# Patient Record
Sex: FEMALE | Race: WHITE | NOT HISPANIC OR LATINO | Employment: STUDENT | URBAN - METROPOLITAN AREA
[De-identification: names, ages, dates, MRNs, and addresses within clinical notes are randomized per-mention and may not be internally consistent; named-entity substitution may affect disease eponyms.]

---

## 2022-02-02 ENCOUNTER — PRENATAL OFFICE VISIT (OUTPATIENT)
Dept: NURSING | Facility: CLINIC | Age: 27
End: 2022-02-02
Payer: COMMERCIAL

## 2022-02-02 DIAGNOSIS — Z34.00 SUPERVISION OF NORMAL FIRST PREGNANCY: Primary | ICD-10-CM

## 2022-02-02 PROCEDURE — 99207 PR NO CHARGE NURSE ONLY: CPT

## 2022-02-02 RX ORDER — VITAMIN A ACETATE, .BETA.-CAROTENE, ASCORBIC ACID, CHOLECALCIFEROL, .ALPHA.-TOCOPHEROL ACETATE, DL-, THIAMINE MONONITRATE, RIBOFLAVIN, NIACINAMIDE, PYRIDOXINE HYDROCHLORIDE, FOLIC ACID, CYANOCOBALAMIN, CALCIUM CARBONATE, FERROUS FUMARATE, ZINC OXIDE, AND CUPRIC OXIDE 2000; 2000; 120; 400; 22; 1.84; 3; 20; 10; 1; 12; 200; 27; 25; 2 [IU]/1; [IU]/1; MG/1; [IU]/1; MG/1; MG/1; MG/1; MG/1; MG/1; MG/1; UG/1; MG/1; MG/1; MG/1; MG/1
1 TABLET ORAL DAILY
COMMUNITY
End: 2022-11-07

## 2022-02-02 NOTE — PROGRESS NOTES
NPN nurse visit done over the phone. Pt will be given NPN folder and book at her upcoming appt.   Discussed optional screening available to assess chromosomal anomalies. Questions answered. Pt advised to call the clinic if she has any questions or concerns related to her pregnancy. Prenatal labs will be obtained at her upcoming appt. New prenatal visit scheduled on 2/7/22 with Aline REINA CNM.    [id number]    No results found for: PAP        Patient supplied answers from flow sheet for:  Prenatal OB Questionnaire.  Past Medical History  Have you ever recieved care for your mental health? : No  Have you ever been in a major accident or suffered serious trauma?: No  Within the last year, has anyone hit, slapped, kicked or otherwise hurt you?: No  In the last year, has anyone forced you to have sex when you didn't want to?: No    Past Medical History 2   Have you ever received a blood transfusion?: No  Would you accept a blood transfusion if was medically recommended?: Yes  Does anyone in your home smoke?: No   Is your blood type Rh negative?: No  Have you ever ?: No  Have you been hospitalized for a nonsurgical reason excluding normal delivery?: No  Have you ever had an abnormal pap smear?: No    Past Medical History (Continued)  Do you have a history of abnormalities of the uterus?: No  Did your mother take KP or any other hormones when she was pregnant with you?: No  Do you have any other problems we have not asked about which you feel may be important to this pregnancy?: No

## 2022-02-03 ENCOUNTER — ANCILLARY PROCEDURE (OUTPATIENT)
Dept: ULTRASOUND IMAGING | Facility: CLINIC | Age: 27
End: 2022-02-03
Payer: COMMERCIAL

## 2022-02-03 ENCOUNTER — LAB (OUTPATIENT)
Dept: LAB | Facility: CLINIC | Age: 27
End: 2022-02-03

## 2022-02-03 DIAGNOSIS — Z34.00 SUPERVISION OF NORMAL FIRST PREGNANCY: ICD-10-CM

## 2022-02-03 LAB
ABO/RH(D): NORMAL
ANTIBODY SCREEN: NEGATIVE
ERYTHROCYTE [DISTWIDTH] IN BLOOD BY AUTOMATED COUNT: 13.2 % (ref 10–15)
HBV SURFACE AG SERPL QL IA: NONREACTIVE
HCT VFR BLD AUTO: 38.7 % (ref 35–47)
HCV AB SERPL QL IA: NONREACTIVE
HGB BLD-MCNC: 12.7 G/DL (ref 11.7–15.7)
HIV 1+2 AB+HIV1 P24 AG SERPL QL IA: NONREACTIVE
MCH RBC QN AUTO: 28.9 PG (ref 26.5–33)
MCHC RBC AUTO-ENTMCNC: 32.8 G/DL (ref 31.5–36.5)
MCV RBC AUTO: 88 FL (ref 78–100)
PLATELET # BLD AUTO: 195 10E3/UL (ref 150–450)
RBC # BLD AUTO: 4.4 10E6/UL (ref 3.8–5.2)
SPECIMEN EXPIRATION DATE: NORMAL
WBC # BLD AUTO: 9.9 10E3/UL (ref 4–11)

## 2022-02-03 PROCEDURE — 36415 COLL VENOUS BLD VENIPUNCTURE: CPT

## 2022-02-03 PROCEDURE — 86780 TREPONEMA PALLIDUM: CPT

## 2022-02-03 PROCEDURE — 87340 HEPATITIS B SURFACE AG IA: CPT

## 2022-02-03 PROCEDURE — 85027 COMPLETE CBC AUTOMATED: CPT

## 2022-02-03 PROCEDURE — 76801 OB US < 14 WKS SINGLE FETUS: CPT | Performed by: OBSTETRICS & GYNECOLOGY

## 2022-02-03 PROCEDURE — 86901 BLOOD TYPING SEROLOGIC RH(D): CPT

## 2022-02-03 PROCEDURE — 86762 RUBELLA ANTIBODY: CPT

## 2022-02-03 PROCEDURE — 86900 BLOOD TYPING SEROLOGIC ABO: CPT

## 2022-02-03 PROCEDURE — 87086 URINE CULTURE/COLONY COUNT: CPT

## 2022-02-03 PROCEDURE — 86850 RBC ANTIBODY SCREEN: CPT

## 2022-02-03 PROCEDURE — 86803 HEPATITIS C AB TEST: CPT

## 2022-02-03 PROCEDURE — 87389 HIV-1 AG W/HIV-1&-2 AB AG IA: CPT

## 2022-02-04 PROBLEM — O26.899 RH NEGATIVE STATE IN ANTEPARTUM PERIOD: Status: ACTIVE | Noted: 2022-02-04

## 2022-02-04 PROBLEM — Z67.91 RH NEGATIVE STATE IN ANTEPARTUM PERIOD: Status: ACTIVE | Noted: 2022-02-04

## 2022-02-04 LAB
BACTERIA UR CULT: NO GROWTH
RUBV IGG SERPL QL IA: 3.82 INDEX
RUBV IGG SERPL QL IA: POSITIVE
T PALLIDUM AB SER QL: NONREACTIVE

## 2022-02-07 ENCOUNTER — PRENATAL OFFICE VISIT (OUTPATIENT)
Dept: MIDWIFE SERVICES | Facility: CLINIC | Age: 27
End: 2022-02-07
Payer: COMMERCIAL

## 2022-02-07 VITALS — WEIGHT: 167 LBS | DIASTOLIC BLOOD PRESSURE: 62 MMHG | SYSTOLIC BLOOD PRESSURE: 102 MMHG

## 2022-02-07 DIAGNOSIS — Z34.01 ENCOUNTER FOR SUPERVISION OF NORMAL FIRST PREGNANCY IN FIRST TRIMESTER: Primary | ICD-10-CM

## 2022-02-07 PROCEDURE — 99207 PR PRENATAL VISIT: CPT | Performed by: ADVANCED PRACTICE MIDWIFE

## 2022-02-07 NOTE — PATIENT INSTRUCTIONS
Welcome to [facility]   and thank you for choosing us for your maternity care provider!    Pregnancy: Body Changes  From conception (fertilization) until after the birth of your child, you and your baby will change every day. To help you understand what is happening, we ve outlined how pregnancy begins and some of the changes you may notice.  How Pregnancy Begins  Conception is the union of a sperm and an egg. When it occurs, your baby s genetic makeup is complete, even its sex. Fertilization takes place in the fallopian tube. The fertilized egg then travels down this tube to the uterus (womb). The egg attaches to the lining of the uterus about a week later. There it grows and is nourished.    Your Changing Body  Pregnancy affects almost every part of your body. You may notice some of the following physical and emotional changes:    Your uterus expands outward and upward as your baby grows. You may feel pressure on your bladder, stomach, and other organs.    You may notice skin color changes on your forehead, nose, and cheeks. A dark line may form from your bellybutton down to your pubic area. The skin color around your nipples and thighs may also change.    Pink stretch marks may appear on your abdomen, breasts, or hips.    Your hair may seem thicker. You lose less hair during pregnancy.    You may feel fine one day and weepy the next. This is caused by changes in your body, such as increased hormones (chemicals that affect the function of certain organs and also your moods).      Adapting to Pregnancy: First Trimester  As your body adjusts, you may have to change or limit your daily activities. You ll need more rest. You may also need to use the energy you have more wisely.  Eat stomach-friendly foods like cottage cheese, crackers, or bread throughout the day.    Your Changing Body  Almost every part of your body is affected as you adapt to pregnancy. The uterus and cervix will begin to soften  right away. You may not look very pregnant during the first three months. But you are likely to have some common signs of early pregnancy:    Nausea    Fatigue    Frequent urination    Mood swings    Bloating of the abdomen    Missed or light periods (first trimester bleeding)    Nipple or breast tenderness, breast swelling      It s Not Too Late to Start Good Habits  What matters most is protecting your baby from this moment on. If you smoke, drink alcohol, or use drugs, now is the time to stop. If you need help, talk with your health care provider.    Smoking increases the risk of stillbirth or having a low-birth-weight baby. If you smoke, quit now.    Alcohol and drugs have been linked with miscarriage, birth defects, intellectual disability, and low birth weight. Do not drink alcohol or take drugs.    Tips to Relieve Nausea  Although nausea can occur at any time of the day, it may be worse in the morning. To help prevent nausea:    Eat small, light meals at frequent intervals.    Get up slowly. Eat a few unsalted crackers before you get out of bed.    Drink water with lemon slices.    Eat an ice pop in your favorite flavor.    Ask your health care provider about taking shanthi or vitamin B6 for nausea and vomiting.    Talk with your health care provider if you take vitamins that upset your stomach.    Work Concerns  The end of the first trimester is a good time to discuss working during pregnancy with your employer. Follow your health care provider s advice if your job requires you to stand for a long time, work with hazardous tools, or even sit at a desk all day. Your workspace, workload, or scheduled hours may need to be adjusted. Perhaps you can change body postures more often or take an extra break.  Advice for Travel  Talk to your health care provider first, but the second trimester may be the best time for any travel. You may be advised to avoid certain trips while you re pregnant. Food and water can be  concerns in developing countries. Travel by car is a good choice, as you can stop, get out, and stretch. Bring snacks and water along. Fasten the lap belt below your belly, low over your hips. Also be sure to wear the shoulder harness.  Intimacy  Unless your health care provider tells you to, there is no reason to stop having sex while you re pregnant. You or your partner may notice changes in desire. Desire may be less in the first trimester, due to nausea and fatigue. In the second trimester, sex may be very enjoyable. The third trimester can be a challenge comfort-wise. Try different positions and see what s best for you both.      Pregnancy: Your First Trimester Changes  The first trimester is a time of rapid development for your baby. Because your baby is growing so quickly, it is important that you start a healthy lifestyle right away. By the end of the first trimester, your baby has formed all of its major body organs and weighs just over an ounce.    Month 1 (Weeks 1-4)  The placenta (the organ that nourishes your baby) begins to form. The heart and lungs begin to develop. Your baby is about 1/4 inch long by the end of the first month.    Month 2 (Weeks 5-8)  All of your baby s major body organs form. The face, fingers, toes, ears, and eyes appear. By the end of the month, your baby is about 1 inch long.    Month 3 (Weeks 9-12)  Your baby can open and close its fists and mouth. The sexual organs begin to form. As the first trimester ends, your baby is about 4 inches long.      Pregnancy: Your Weight  Being a healthy weight is important for both you and your baby. The weight you gain now is not just extra fat. It is also the weight of your baby. And it is the increased blood and fluids to support the baby. A slow, steady rate of gain is best. How much you should gain depends on your weight before getting pregnant. Check with your health care provider to find out what is right for you.    If You Gain Too  Much  Gaining too much weight might cause you to feel tired or you could have a harder pregnancy or birth. If you and your health care provider decide you re gaining too much:    Eat fewer fats and sugars. Instead, eat fruit, vegetables, and whole-grain foods.    Drink plenty of water between meals.    Get at least 20 minutes of light exercise, such as walking, each day.    Don t diet. You might not get enough of the nutrients you or your baby needs.    Keep a diet diary to help you gauge what and how much you are eating .    If You re Not Gaining Enough  If you don t gain enough, your baby could be too small or have health problems. Women tend to gain most of their weight in the second and third trimesters. For now:    Eat many types of foods. Make sure you get enough calcium, protein, and carbohydrates.    Don t skip meals.    Eat healthy snacks.    Pick nutrient-dense, high calorie healthy food like trail mix or protein shakes.    See a dietitian for help.    Talk to your healthcare provider if you have had an eating disorder or problems with certain foods.      Pregnancy: Common Questions  There are plenty of myths and  old wives  tales  surrounding pregnancy. You may need help  fact from fiction. On this sheet, you ll find answers to a few common questions. If you have other questions, talk with a midwife.    Will Working Harm My Baby?  In most cases, working throughout your pregnancy is not harmful at all. There may be concerns if the job involves dangerous machinery or chemicals, lifting, or standing for very long periods of time. Talk to your health care provider and employer about your particular job and pregnancy.  Why Can t I Change the Cat Litter Box?  Cats carry a disease called toxoplasmosis. In adult humans, it shows up as a mild infection of the blood and organs. If you are infected during pregnancy, the baby s brain and eyes could be damaged. To be safe, have someone else change the  litter. If you must handle it, wear a paper mask over your nose and mouth. Also, wear gloves and wash your hands afterward.  Which Medications Are Safe?  No prescription or over-the-counter drug is safe for everyone all of the time. But sometimes medications are needed. Be sure your health care provider knows you are pregnant. Then use only the medications he or she advises you to take. Please refer to the below resources for further information and discuss concern and questions with your midwife.  Is It True That I Can Overheat My Baby?  Yes. To avoid making your baby too warm:    Don t sit in a jacuzzi. A long, warm bath is fine, but not in water over 100 F.    Exercise less intensely if you feel fatigued. Base your workout on how you feel, not your heart rate. Heart rates aren t a good way to measure effort during pregnancy.  Can I Lift and Carry Safely?  Yes, if your health care provider doesn t tell you otherwise. Learn to lift and carry safely to avoid injury and reduce back pain during pregnancy. To protect your back:    Bend at the knees to bring the load nearer.    Get a good . Test the weight of the load.    Tighten your abdomen. Exhale as you lift.    Lift with your legs, not with your back.    Carry the load close to your body.    Hold the load so you can see where you are going.  What If I Get Sick?  Most women get sick at least once during pregnancy. Talk with your health care provider if you do. Most likely it will not affect your pregnancy. Get plenty of rest and fluids, and eat what you can. Talk to your health care provider before taking any medications.        HEALTHY PREGNANCY CARE: 10-14 WEEKS PREGNANT     By weeks 10 to 14 of your pregnancy, the placenta has formed inside your uterus. It may be possible to hear your baby's heartbeat with a doppler ultrasound device. Your baby's eyes can and do move. The arms and legs can bend.    GENETIC SCREENING OPTIONS AT [facility]                 All testing is optional. We don t recommend or discourage any test; it is totally up to you and your partner. Some couples wish to know their risk of having a baby with a genetic defect and others do not. We will support your decision. Abnormal results may lead to a discussion of options for further testing.    Accurate dating of your pregnancy is important for all testing so an ultrasound may be done prior to referral or testing.    No testing provides certainty; there are false positives and negatives associated with all testing, some more than others.    Most genetic testing is non-invasive (requires only a blood sample and sometimes an ultrasound or both).    It is always wise to check with your insurance carrier before proceeding.    Some testing can be done at our lab and some require a referral.    If you decide to do no testing, the 20 week ultrasound scan, which is a routine or standard ultrasound, has some ability to detect abnormalities in the baby, and identifies obstetric problems.    If you are over 35, you will have the option of a Level II ultrasound, which is a more detailed and targeting scan, that helps detect fetal anomalies as well as obstetric problems.      If you have a more complex family history of chromosomal abnormalities, a referral to a genetic counselor and/or a Maternal Fetal Medicine specialist, to help identify available tests, may be recommended.    TYPES OF GENETIC TESTING AVAILABLE INCLUDE:    Carrier Screening/Testing for Genetic Conditions    There are many inherited conditions for which testing or carrier testing is available. Carrier screening can test for conditions like Cystic Fibrosis, Thalassemia, Adan Sachs, Sickle Cell, Hemophilia, Muscular Dystrophy, Teo s disease and many others.     Cystic Fibrosis (CF) affects both males and females and people from all racial and ethnic groups. However, the disease is most common among Caucasians of Northern   descent. CF is also common among Latinos and American Indians. The disease is less common among  Americans and  Americans. More than 10 million Americans are carriers of a faulty CF gene and many of them don't know that they are CF carriers. One or both parents can be tested any time before or during pregnancy.    Talk to your care provider about your family history and whether you should be screened. A referral to a genetic counselor at [facility] or [facility] can be made by your care provider at any time.    This is also tested for in the [place] Metabolic Screen that your infant receives 24 hours after birth.     Fetal DNA cell Testing: [place] or Innatal (Non-Invasive Prenatal Testing)    At 10 wk or greater, a blood sample can be drawn here at clinic or at any of our referral offices. It will provide highly accurate results with low false positive rates for trisomy 18, trisomy 21 (Down Syndrome), and trisomy 13 (> 99% trisomy detection rate at a false positive rate of <0.1%). Gender identification can also be obtained if desired (98% accuracy).  Can also detect some sex-linked chromosomal abnormalities (80-90% accuracy).  This is often done if one of the other screening tests is abnormal.        1st Trimester Screening:     Everyone has an age related risk of having a baby with a genetic abnormality. This testing provides a risk profile which is better than assigning risk based solely on your age.    Refines your risk of having a baby with a chromosomal abnormality such as Trisomy 21 & 18.    This test with detect a fetus with one of these disorders about 85% of the time.    A thickened nuchal fold can also be associated with cardiac defects.    This test requires a blood collection combined with ultrasound to obtain a measurement of fluid at back of baby s neck (nuchal translucency).    False positive results occur approximately 5% of cases.    An additional blood sample may be  necessary in order to calculate your risk of having a baby with a neural tube defect (e.g. spina bifida).  This is called the AFP test (alpha fetal protein).  An ultrasound should be able to  any spinal defect as well.    Follow-up of an abnormal test may include a more extensive ultrasound study and you may be offered an amniocentesis (a small sample of amniotic fluid is withdrawn and studied) for a definitive diagnosis    Quad Screen (4-marker screen)    Between 15 and 21 weeks, a sample of blood can be drawn at our lab to assess your risk of having a baby with Down Syndrome, Trisomy 18 and neural tube defects. Such testing is able to detect these conditions in 80% of cases and the false-positive rate is approximately 5%.     Follow-up of an abnormal test may include a more extensive ultrasound study and you may be offered an amniocentesis (a small sample of amniotic fluid is withdrawn and studied) for a definitive diagnosis      Referrals opportunities include:    [facility],  [facility], [facility]  o Offers nuchal translucency ultrasound; does not offer genetic counseling.    [place]  [facility] ([facility]):   o Approximately an hour long visit includes 30 min with genetic counselor who discusses all testing available and which ones might be beneficial to you based on age, personal and family history.    o Blood will be drawn and the nuchal translucency ultrasound will be discussed and performed if desired. The Free Fetal DNA testing ([place]/[facility]) can be drawn also.  o Targeted or detailed Level II ultrasounds are also available with these perinatology groups.        Breastfeeding: a Healthy Option for You and Your Baby  Consider breastfeeding for the healthiest way to feed your baby. Ask your midwife or physician for more information.     The choice of how you will feed your baby is important.  Before your baby s birth, you ll want  to learn about the benefits of breastfeeding.  [facility] have been designated Baby Friendly; an initiative that was created by the World Health Organization and UNICEF.  This helps give you and your baby the best start in feeding their baby.    Why should I breastfeed my baby?    Babies are less likely to develop childhood obesity or diabetes    Babies are less likely to suffer from recurrent ear infections    Babies are less likely to be hospitalized for respiratory conditions    Breast milk is rich in nutrients and antibodies-it is easy to digest    How does it benefit me?    Lowers the risk for diabetes, breast and ovarian cancer and postpartum depression    Moms can lose  baby weight  more quickly    Cost savings - formula can cost well over $1,500 per year    Convenient - no bottles and nipples to sterilize, no measuring and mixing formula    The physical contact with breastfeeding can make babies feel secure, warm and comforted     What about formula?  While you and your baby are staying with us at [facility], we will support whatever feeding choice you make for your baby.    Some important considerations:      The American Academy of Pediatrics, the World Health Organization, and many more organizations recommend exclusive breastfeeding for 6 months and continued breastfeeding while adding other foods for the first 1-2 years.      Any amount of breastmilk has benefits to both baby and mother.    Giving formula in replacement of breastfeeding can affect mother s milk supply.  If formula is needed, hospital staff will work with you on a plan to help develop your milk supply.    Formula alters the natural growth of good bacteria in the  stomach.     Research has found that first time mothers who offer formula in the hospital have a shorter duration of breastfeeding.    How can I start to prepare?     Start by having a conversation with your medical provider.     Talk with your partner, family  and friends.     Attend a prenatal class that includes breastfeeding preparation. Birth and breastfeeding classes are offered by [facility]. Visit [employer] for class information.     After your baby s birth, hospital staff and lactation consultants will help you and your baby get off to a great start with breastfeeding.    As your center of gravity and weight changes, use good body mechanics when changing positions and lifting. For example, use a straight back and your legs for support when lifting instead of bending over. Maintain good posture to prevent straining your muscles. Now is a good time to continue or restart your exercise program. Walking 30-60 minutes daily is an excellent way to keep fit. Yoga and swimming also offer many benefits.    The nausea and fatigue of early pregnancy have usually started to let up, so this is a good time to focus on nutrition. Consider attending a nutrition class. A healthy diet includes about 60 grams of protein each day (3-4 servings of dairy, 2-3 servings of meat/fish/poultry/nuts), 4-6 servings of whole grain foods, and 5-6 servings of fruits and vegetables. Remember to drink 6-8 glasses of water daily.    Watch for warning signs, such as     vaginal bleeding    fluid leaking from your vagina    severe abdominal pain    nausea and vomiting more than 4-5 times a day, or if you are unable to keep anything down    fever more than 100.4 degrees F.       RESOURCES   You can refer to the Starting Out Right book or find it online at http://www.[facility].org/images/stories/maternity/[facility]-Starting-Out-Right.pdf or http://www.[facility].org/images/stories/flipbooks/[facility]-starting-out-right/[facility]-starting-out-right.html#p=8    You can sign up for a weekly parenting e-mail that gives support, tips and advice from health care professionals that starts with pregnancy and continues through the toddler years. To register, go to  www.[facility].org/baby at any time during your pregnancy.    Breastfeeding:    OUTPATIENT LACTATION RESOURCES     -Schedule an appointment [facility]ATH[facility] who is also a Lactation Consultant by calling [phone]. We see women for breastfeeding visits at [facility] and [facility].     -Baby Café    Pregnant and interested in breastfeeding?  Need answers to breastfeeding questions?  Want to help breastfeeding moms?  Already breastfeeding and want to meet other moms?    Join us at the Baby Café!    Baby Cafe is a free, drop-in service offering breast-feeding support for pregnant women, breast-feeding mothers and their families.  Come share tips and socialize with other mothers.  Babies and siblings are welcome (no childcare available).    Starting April 2018, Baby Café will be at 4 locations.  Please see below for the Baby Café closest to you! Hmong, Emirati, and [place]an which is may be available at some sites.    Childbirth and Parenting Education:       Everyday Miracles:   https://www.everyday-miracles.org/    Free Video Series from [facility]: https://nursing.[facility]/academics/specialty-areas/nurse-midwifery/having-baby-prenatal-videos/having-baby-prenatal-and    REYNA parenting center: http://STinser/   (779) 863-BABY  [place]: (education, yoga & wellness) www.[employer]asticell  Enlightened Mama: www.enlightenedmama.Plasticell   Childbirth collective: (Parent topic nights)  www.childbirthcollective.org/  Hypnobabies:  www.hypnobabiestwincities.com/  Hypnobirthing:  Http://hypnobirthing.com/  The Birth Hour: https://thebirthPinchPoint.[employer]asticell/online-childbirth-class/    APPS and Podcasts:   Kaitlyn Russo Nurture    Evidence Based Birth  The Birth Hour (for birth stories)   Birthful   Expectful   The Longest Shortest Time  PregnancyPodcast Ev Youssef    Book Recommendations:   Almita Totz's Birthing From Within--first few chapters include a new-age tone,  you may prefer to skip it and keep going, because there is good stuff later.  This book recommendation covers emotional preparation, but does cover coping with pain, and use of both pharmacological and nonpharmacological methods.    Dr. Saleh' The Pregnancy Book and The Birth Book--the pregnancy book goes month-by month      The Birth Partner by Ketty Mirza    Womanly Art of Breastfeeding by La Leche League International   Bestfeeding by Isabela Zapata--great pictures    Mothering Your Nursing Toddler, by Tawnya Donato.   Addresses dealing with so many of the challenging behaviors of a nursing toddler.  How Weaning Happens, by La Leche League.  Discusses weaning at all ages, from medically necessary weaning of an infant, all the way up to age 5 (or older), with why/why not, and strategies.  Very empowering book both for deciding to wean and deciding not to.    American College of Nurse-Midwives (ACNM) http://www.midwife.org/; look at the informational handouts at http://www.midwife.org/Share-With-Women     www.mymidwife.org    Mother to Baby (Medication and Herbal guidance in pregnancy): http://www.mothertobaby.org  Toll-Free Hotline: 218.814.3159  LactMed (Medication use while breastfeeding): http://toxnet.nlm.nih.gov/newtoxnet/lactmed.htm    Women's Health.gov:  http://www.womenshealth.gov/a-z-topics/index.html    American pregnancy association - http://americanpregnancy.org    Centering Pregnancy (group prenatal care option): http://centeringhealthcare.org    Information about doulas:  Childbirth collective: http://www.childbirthcollective.org/  Doulas of [place] (MANDA):  www.manda.org  [facility]  project: http://twincitiesdoulaproject.com/     Early Childhood and Family Education (ECFE):  ECFE offers parents hands-on learning experiences that will nourish a lifetime of teachable moments.  http://ecfe.info/ecfe-home/    March of Dimes www.HauteDay     FDA - Nutrition  www.mypyramid.gov   "Under \"For Consumers,\" click on \"pregnant and breastfeeding women.\"      Centers for Disease Control and Prevention (CDC) - Vaccines : http://www.cdc.gov/vaccines/       When researching information on the web, question the validity of websites.  The domains .gov, .edu and.org tend to be more reliable information.  If there are a lot of advertisements, be cautious of the information provided. Stay away from blogs and chat rooms please!      Nutrition & supplements:     Prenatal vitamin (those with 600-1000 mcg folic acid and 27 mg of iron are enough).  Take with food or Juice     4-5 servings of dairy or other calcium rich foods (fish, leafy greens, soy) per day - if not, take 500-1000 mg additional calcium (Tums, pills, chews). Take with dairy     Vitamin D3 5778-2604 IU geltab daily.  Take with fattiest meal.  Look for fortified foods also (Dairy, Juice)     2-3 (4) oz servings of fish, seafood, nuts (walnuts & almonds), oils, avocado per week - if not, take Omega 3 Fatty acids: DHA & TR 4521-5284 mg per day.  Other names: cod liver oil, fish oil. Take with fattiest meal.  Some prenatals have DHA, but typically not a sufficient dose.    Fish: Do not eat shark, swordfish, dedra mackerel, or tilefish when you are pregnant or breastfeeding.  They contain high levels of mercury.  Limit white (albacore) tuna to no more than 6 ounces per week. Http://www.fda.gov/downloads/ForConsumers/ConsumerUpdates/XPJ555236.pdf           "

## 2022-02-07 NOTE — NURSING NOTE
Chief Complaint   Patient presents with     Prenatal Care     8 weeks- concerns of nausea        Initial /62 (BP Location: Left arm, Patient Position: Sitting, Cuff Size: Adult Regular)   Wt 75.8 kg (167 lb)   LMP 12/10/2021  There is no height or weight on file to calculate BMI.  BP completed using cuff size: regular    Questioned patient about current smoking habits.  Pt. has never smoked.          The following HM Due: NONE    8w3d  Amandeep Munguia, CMA

## 2022-02-07 NOTE — PROGRESS NOTES
PRENATAL VISIT   FIRST OBSTETRICAL EXAM - OB     Assessment / Impression   First prenatal visit at 8w3d  25yo    Last pap = 2019  BMI - need ei  EDPS = 0, 0 to #10  THOMAS for old records     Plan:      -IOB labs reviewed   -Pt is not a candidate for drawing lead level per [facility] screening tool.   -Patient is not interested in waterbirth.    -Reviewed prenatal care schedule.   -Optimal nutrition and weight gain discussed. Pregnancy weight gain of 25-35 lbs (BMI 18.5-24.9) encouraged.   -Anticipatory guidance for common pregnancy questions and concerns reviewed.   -Danger s/sx for this trimester reviewed with patient.   -Reviewed genetic screening options with patient, patient is considering elect for first trimester screening. The patient does not elect for quad screening.   -Reviewed carrier testing options with patient, patient does not elect for testing or referral to genetic counseling.   Discussed MFM level 2 due to 1/2 brother with congential heart concern.   -IOB packet given and reviewed with patient.   -CNM services and hospital options reviewed; emergency and scheduling phone numbers given to patient.   -The patient has the following high risk factors for preeclampsia:none. Therefore, low-dose aspirin will not be initiated.  -The patient has the following moderate risk factors for preeclampsia:first pregnancy. Because the patient does not have 2 or more risk factors, low-dose aspirin not be initiated.  -Antepartum VTE risk factors absent.  -Patient is not at increased risk for overt diabetes, so A1C will not be added to IOB labs today.  -Pt may be a candiate for MFM with 1/2 brother  a candidate for an antepartum OB consult.    -Return to clinic in 4 weeks or sooner as needed.    Total time: 45 minutes spent on the date of the encounter doing chart review, review of test results, patient visit and documentation.     Subjective:   Monika Yan is a 26 year old  here today for her first obstetrical  exam at 8w3d. Here with Mitchel. This pregnancy is planned Attempting pregnancy for months. The patient reports nausea, but no vomiting, fatigue and some mild breast tenderness. She has a certain Patient's last menstrual period was 12/10/2021., predicting an expected date of delivery of Estimated Date of Delivery: Sep 16, 2022.  Her pregnancy is dated by LMP.     The patient states that she is in a monogamous relationship.  Offered GC/CT screening today and patient declines.  Current symptoms also include: fatigue and nausea.     Risk factors: none. Pregnancy Risk Factors:None    VTE antepartum risk factors (two or more risk factors, or 1 * risk factor, places patient at higher risk): none.   Clinical history/risk factors requiring antepartum OB consult: none.     The patient has the following risk factors for overt diabetes: Not at increased risk, BMI normal (or under 23 for  Americans). Plus the additional risk factor(s) of: No additional risk factors.    Social History:   Education level: college. Just finishing [employer] language program    Occupation: student    Partners name: Mitchel    ?   OB History    Para Term  AB Living   1 0 0 0 0 0   SAB IAB Ectopic Multiple Live Births   0 0 0 0 0      # Outcome Date GA Lbr Edgar/2nd Weight Sex Delivery Anes PTL Lv   1 Current                 History:   Past Medical History:   Diagnosis Date     No pertinent past medical history 2022      Past Surgical History:   Procedure Laterality Date     NO HISTORY OF SURGERY  2022      Family History   Problem Relation Age of Onset     Heart Defect Brother       Social History     Tobacco Use     Smoking status: Never Smoker     Smokeless tobacco: Never Used   Vaping Use     Vaping Use: Never used   Substance Use Topics     Alcohol use: Not Currently     Drug use: Never      Current Outpatient Medications   Medication Sig Dispense Refill     calcium-magnesium (CALMAG) 500-250 MG TABS per tablet Take 1 tablet  "by mouth 2 times daily       Prenatal Vit-Fe Fumarate-FA (PNV PRENATAL PLUS MULTIVITAMIN) 27-1 MG TABS per tablet Take 1 tablet by mouth daily        No Known Allergies     The patient's medical, surgical and family histories were reviewed and were pertinent to this visit.     Pap smear: Last Pap: 2019, Result: normal, Previous History: no, Any history of abnormal: no. Next Due: 2023.     EPDS score today: 0.\"0\" to #10   History of anxiety or depression: yes- anxiety  (mom is bipolar)     Review of Systems   General: Fatigue but otherwise denies problem   Eyes: Denies problem   Ears/Nose/Throat: Denies problem   Cardiovascular: Denies problem   Respiratory: Denies problem   Gastrointestinal: Nausea without vomiting, otherwise negative   Genitourinary: Denies any discharge, vaginal bleeding or itchiness or any other problem   Musculoskeletal: Breast tenderness otherwise denies problem   Skin: Denies problem   Neurologic: Denies problem   Psychiatric: Denies problem   Endocrine: Denies problem   Heme/Lymphatic: Denies problem   Allergic/Immunologic: Denies problem         Objective:   Objective    Vitals:    02/07/22 1136   BP: 102/62   Weight: 75.8 kg (167 lb)        Physical Exam:   General Appearance: Alert, cooperative, no distress, appears stated age   HEENT: Normocephalic, without obvious abnormality, atraumatic. Conjunctiva/corneas clear, does not wear corrective lenses   Neck: Supple  Lungs: Clear to auscultation bilaterally, respirations unlabored   Heart: Regular rate and rhythm, S1 and S2 normal, no murmur, rub, or gallop. No edema to lower extremities.   Breasts: deferred  Abdomen: Gravid    FHT: not attempted to due gestational age  Neurologic: Alert and oriented x 3. Normal speech   Lab: No results found for any visits on 02/07/22.            "

## 2022-02-21 ENCOUNTER — PRENATAL OFFICE VISIT (OUTPATIENT)
Dept: MIDWIFE SERVICES | Facility: CLINIC | Age: 27
End: 2022-02-21
Payer: COMMERCIAL

## 2022-02-21 VITALS — DIASTOLIC BLOOD PRESSURE: 66 MMHG | WEIGHT: 166.9 LBS | SYSTOLIC BLOOD PRESSURE: 106 MMHG

## 2022-02-21 DIAGNOSIS — Z34.01 ENCOUNTER FOR SUPERVISION OF NORMAL FIRST PREGNANCY IN FIRST TRIMESTER: Primary | ICD-10-CM

## 2022-02-21 LAB — INTERPRETATION: NORMAL

## 2022-02-21 PROCEDURE — 36415 COLL VENOUS BLD VENIPUNCTURE: CPT | Performed by: REGISTERED NURSE

## 2022-02-21 PROCEDURE — 99207 PR PRENATAL VISIT: CPT | Performed by: REGISTERED NURSE

## 2022-02-21 NOTE — PATIENT INSTRUCTIONS
Weeks 9 thru 13 - Gestational Age (Fetal Development - Weeks 7 thru 11)  At 10 weeks, the embryo is at the end of the embryonic period and begins the fetal period. The fetus has grown to about 3 inches in length and weighs about an ounce. The genitalia have clearly formed into male or female, but still can not be seen clearly on an ultrasound. The eyelids close and will not reopen until the around 28 weeks of gestation. The fetus can make a fist, and the buds for baby teeth appear. The head is nearly half the size of the entire fetus.

## 2022-02-21 NOTE — NURSING NOTE
Chief Complaint   Patient presents with     Prenatal Care   10w3d  Discuss NIPS    initial /66   Wt 75.7 kg (166 lb 14.4 oz)   LMP 12/10/2021  There is no height or weight on file to calculate BMI.  BP completed using cuff size regular.  Katy Og CMA

## 2022-03-01 ENCOUNTER — TELEPHONE (OUTPATIENT)
Dept: OBGYN | Facility: CLINIC | Age: 27
End: 2022-03-01
Payer: COMMERCIAL

## 2022-03-01 LAB — SCANNED LAB RESULT: NORMAL

## 2022-03-01 NOTE — TELEPHONE ENCOUNTER
Pt advised of Neg NIPS.    Pt requests that the gender being mailed to her home address.  Alize NICOLE RN

## 2022-03-01 NOTE — TELEPHONE ENCOUNTER
Please call Monika and let her know about her negative genetic screen. She would like the sex of the baby placed in an envelope and left at .      Thank you,    BEN Dobbins, SATHISH

## 2022-03-23 ENCOUNTER — PRENATAL OFFICE VISIT (OUTPATIENT)
Dept: MIDWIFE SERVICES | Facility: CLINIC | Age: 27
End: 2022-03-23
Payer: COMMERCIAL

## 2022-03-23 VITALS — DIASTOLIC BLOOD PRESSURE: 66 MMHG | SYSTOLIC BLOOD PRESSURE: 102 MMHG | WEIGHT: 170.9 LBS

## 2022-03-23 DIAGNOSIS — Z82.79 FAMILY HISTORY OF CONGENITAL HEART DEFECT: ICD-10-CM

## 2022-03-23 DIAGNOSIS — Z34.01 ENCOUNTER FOR SUPERVISION OF NORMAL FIRST PREGNANCY IN FIRST TRIMESTER: Primary | ICD-10-CM

## 2022-03-23 DIAGNOSIS — Z36.89 ENCOUNTER FOR FETAL ANATOMIC SURVEY: ICD-10-CM

## 2022-03-23 PROCEDURE — 99207 PR PRENATAL VISIT: CPT | Performed by: ADVANCED PRACTICE MIDWIFE

## 2022-03-23 RX ORDER — FERROUS SULFATE 325(65) MG
325 TABLET ORAL
Status: ON HOLD | COMMUNITY
End: 2022-09-25

## 2022-03-23 RX ORDER — CHLORAL HYDRATE 500 MG
2 CAPSULE ORAL DAILY
COMMUNITY
End: 2022-10-05

## 2022-03-23 NOTE — NURSING NOTE
Chief Complaint   Patient presents with     Prenatal Care     14w 5d       Initial /66 (BP Location: Right arm, Cuff Size: Adult Regular)   Wt 77.5 kg (170 lb 14.4 oz)   LMP 12/10/2021   Breastfeeding No  There is no height or weight on file to calculate BMI.  BP completed using cuff size: regular    Questioned patient about current smoking habits.  Pt. has never smoked.          The following HM Due: NONE

## 2022-03-23 NOTE — PROGRESS NOTES
S: Patient feels well overall. Feels that nausea is improving. Denies bleeding, leaking of fluid, abnormal discharge. Does report some ear pressure and intermittent ear popping. She states that this has been an ongoing issue for her since 2018. She feels that it has worsened in pregnancy. She also is experiencing increased nasal congestion. She does take Zyrtec daily, which gives her some relief.     In addition, she has a small wart on her right hand. She is wondering what is safe in pregnancy for removal.     O: /66 (BP Location: Right arm, Cuff Size: Adult Regular)   Wt 77.5 kg (170 lb 14.4 oz)   LMP 12/10/2021   Breastfeeding No    Exam:  Constitutional: healthy, alert and no distress  Respiratory: Respirations even and unlabored  Gastrointestinal: Abdomen soft, non-tender. Fundus measures appropriately for gestational age. Fetal heart tones heard easily  Psychiatric: mentation appears normal and affect normal/bright  A:  1. (Z34.01) Encounter for supervision of normal first pregnancy in first trimester  (primary encounter diagnosis)    2. (Z82.79) Family history of congenital heart defect  Plan: [facility] Referral - Pregnancy         - Level II US d/t half brother with heart defect which required a heart transplant    3. (Z36.89) Encounter for fetal anatomic survey     P:   - Educated about diet and exercise and normal weight gain   - Normal to feel movement between 18-22 weeks   - Reviewed labs from 1st OB   - Discussed genetic screening; patient has had NIPT, negative.   - Warning signs discussed   - Discussed option for Quad screen at next visit.      - Discussed Compound W as a safe OTC method for wart removal   - Discussed concerns about congestion, ear pressure and ear popping. Discussed Sudafed as an option to try after 16 weeks. Also recommended seeing and ENT following pregnancy, given the chronic nature of the ear pressure/popping.     Return to clinic 4 weeks. Encouraged patient to  call with any questions or concerns.    FRANDY Marcano    I was present with the student who participated in the service and documentation of the services provided. I have verified the history and personally performed the physical exam and medical decision making as documented by the student and edited by me.    BEN Rodriguez, CNM

## 2022-03-23 NOTE — PATIENT INSTRUCTIONS
Over-the-counter (OTC) medications during pregnancy    Make sure to follow package directions for dosing and information unless otherwise noted on the list.    Morning sickness/nausea:      Unisom (doxylamine) 12.5 mg (1/2 tab) and Vitamin B6 25-50 mg three times daily. Unisom may cause some drowsiness as it is typically used for sleep. The combination of these medications can be very effective but they can also be taken separately    Dramamine (Dimenhydrinate) 25-50 mg every 4-6 hours as needed    Benadryl (diphenhydramine) 25-50 mg every 4-6 hours     Ginger tablets 1000 mg per day in divided doses    Constipation:      Colace (Docusate sodium)    Metamucil    Citrucel    Milk of magnesia    Fibercon    Miralax (if all other methods have failed)    Diarrhea:    Imodium (loperamide)    Heartburn:      Zantac (ranitidine)    Pepcid (famotidine)    Prilosec (omeprazole)    Antacids-Tums, Maalox (liquid or tablets), Rolaids  Pepto Bismol and Tri [place] are NOT RECOMMENDED for use during pregnancy because they contain aspirin    Hemorrhoids:      Tucks pads/ointment    Anusol/Anusol-HC    Preparation H    Gas Pain  Simethicone (Gas-X, Mylanta Gas, Mylicon)      Cough, cold, and congestion:      Robitussin (dextromethorphan) and Robitussin DM (dextromethorphan and guaifenesin)    Cough drops/zinc lozenges    Mucinex    Sudafed (after 16 weeks gestation)    Vicks Vapo rub  Cough medicine with alcohol like Nyquil is not recommended during pregnancy    Headache:      Acetaminophen (Tylenol) 650 mg every 4-6 hours or 1000 mg every 6 hours, do not exceed 4000 mg in 24 hours   Ibuprofen (Advil/Motrin) and Naproxen (Aleve) are not recommended during the 1st or 3rd trimester of pregnancy    Allergy:      Benadryl (diphenhydramine)    Zyrtec (cetirizine)    Claritin (loratadine)     Rash/Itching:      Benadryl lotion    Cortaid cream (Hydrocortisone cream)    Benadryl (diphenhydramine)    Vaginal yeast infection:      Monistat  3 or 7 day    Gyne-Lotrimin    Acne:      Benzoyl Peroxide    Salicylic acid     If you have questions or concerns about any medications that are available OTC or you are unsure if something is safe call:    Chalo Dos Santos  [phone]

## 2022-03-24 ENCOUNTER — TRANSCRIBE ORDERS (OUTPATIENT)
Dept: MATERNAL FETAL MEDICINE | Facility: CLINIC | Age: 27
End: 2022-03-24
Payer: COMMERCIAL

## 2022-03-24 DIAGNOSIS — O26.90 PREGNANCY RELATED CONDITION: Primary | ICD-10-CM

## 2022-03-24 DIAGNOSIS — O26.90 PREGNANCY RELATED CONDITION, ANTEPARTUM: Primary | ICD-10-CM

## 2022-04-03 ENCOUNTER — HEALTH MAINTENANCE LETTER (OUTPATIENT)
Age: 27
End: 2022-04-03

## 2022-04-20 ENCOUNTER — PRE VISIT (OUTPATIENT)
Dept: MATERNAL FETAL MEDICINE | Facility: CLINIC | Age: 27
End: 2022-04-20
Payer: COMMERCIAL

## 2022-04-22 ENCOUNTER — PRENATAL OFFICE VISIT (OUTPATIENT)
Dept: MIDWIFE SERVICES | Facility: CLINIC | Age: 27
End: 2022-04-22
Payer: COMMERCIAL

## 2022-04-22 VITALS
DIASTOLIC BLOOD PRESSURE: 58 MMHG | WEIGHT: 176 LBS | SYSTOLIC BLOOD PRESSURE: 108 MMHG | BODY MASS INDEX: 26.67 KG/M2 | HEIGHT: 68 IN

## 2022-04-22 DIAGNOSIS — Z34.02 ENCOUNTER FOR SUPERVISION OF NORMAL FIRST PREGNANCY IN SECOND TRIMESTER: Primary | ICD-10-CM

## 2022-04-22 PROCEDURE — 99207 PR PRENATAL VISIT: CPT | Performed by: ADVANCED PRACTICE MIDWIFE

## 2022-04-22 NOTE — PATIENT INSTRUCTIONS
Weeks 21 thru 23 - Gestational Age (Fetal Age - Weeks 19 thru 21):  You may be feeling fetal movement every day now. Lanugo now covers the fetus s entire body. The fetus is beginning to have the look of a  infant as the skin becomes less see through and  fat begins to develop. All the parts of the eyes are developed. The liver and pancreas are working hard to develop completely. The fetus has reached about 10-11 inches in length and weighs about 1 - 1   pounds

## 2022-04-22 NOTE — PROGRESS NOTES
S: Feels well and has no concerns.  Has not started feeling fetal movement.  Denies uterine cramping, vaginal bleeding or leaking of fluid  O: Vitals: /58 (BP Location: Right arm, Cuff Size: Adult Regular)   Wt 79.8 kg (176 lb)   LMP 12/10/2021   BMI= There is no height or weight on file to calculate BMI.  Exam:  Constitutional: healthy, alert and no distress  Respiratory: respirations even and unlabored  Gastrointestinal: Abdomen soft, non-tender. Fundus measures appropriate for gestational age. Fetal heart tones hear without difficulty and within normal limits  : Deferred  Psychiatric: mentation appears normal and affect normal/bright  A:    Diagnosis Comments   1. Encounter for supervision of normal first pregnancy in second trimester         P: Discussed 20 week fetal screen with MFM next week  Encouraged patient to call with any questions or concerns.      BEN Dobbins, CNM

## 2022-04-27 ENCOUNTER — OFFICE VISIT (OUTPATIENT)
Dept: MATERNAL FETAL MEDICINE | Facility: CLINIC | Age: 27
End: 2022-04-27
Attending: OBSTETRICS & GYNECOLOGY
Payer: COMMERCIAL

## 2022-04-27 ENCOUNTER — HOSPITAL ENCOUNTER (OUTPATIENT)
Dept: ULTRASOUND IMAGING | Facility: CLINIC | Age: 27
Discharge: HOME | End: 2022-04-27
Attending: OBSTETRICS & GYNECOLOGY
Payer: COMMERCIAL

## 2022-04-27 DIAGNOSIS — O26.90 PREGNANCY RELATED CONDITION, ANTEPARTUM: Primary | ICD-10-CM

## 2022-04-27 DIAGNOSIS — O26.90 PREGNANCY RELATED CONDITION, ANTEPARTUM: ICD-10-CM

## 2022-04-27 DIAGNOSIS — Z82.79 FAMILY HISTORY OF CONGENITAL HEART DISEASE: ICD-10-CM

## 2022-04-27 PROCEDURE — 76811 OB US DETAILED SNGL FETUS: CPT

## 2022-04-27 PROCEDURE — 76811 OB US DETAILED SNGL FETUS: CPT | Mod: 26 | Performed by: OBSTETRICS & GYNECOLOGY

## 2022-04-27 NOTE — PROGRESS NOTES
The patient was seen for an ultrasound in the [facility] at the [facility] today.  For a detailed report of the ultrasound examination, please see the ultrasound report which can be found under the imaging tab.    Estefany Archibald MD  , OB/GYN  Maternal-Fetal Medicine  [phone] (Pager)

## 2022-05-18 ENCOUNTER — PRENATAL OFFICE VISIT (OUTPATIENT)
Dept: MIDWIFE SERVICES | Facility: CLINIC | Age: 27
End: 2022-05-18
Payer: COMMERCIAL

## 2022-05-18 VITALS — BODY MASS INDEX: 26.77 KG/M2 | SYSTOLIC BLOOD PRESSURE: 112 MMHG | WEIGHT: 176 LBS | DIASTOLIC BLOOD PRESSURE: 66 MMHG

## 2022-05-18 DIAGNOSIS — Z82.79 FAMILY HISTORY OF CONGENITAL HEART DEFECT: ICD-10-CM

## 2022-05-18 DIAGNOSIS — Z34.02 ENCOUNTER FOR SUPERVISION OF NORMAL FIRST PREGNANCY IN SECOND TRIMESTER: Primary | ICD-10-CM

## 2022-05-18 PROCEDURE — 99207 PR PRENATAL VISIT: CPT | Performed by: ADVANCED PRACTICE MIDWIFE

## 2022-05-18 NOTE — PATIENT INSTRUCTIONS
"    Characteristics and sources of common FODMAPs    Word that corresponds to letter in acronym Compounds in this category Foods that contain these compounds   F Fermentable   O Oligosaccharides Fructans, galacto-oligosaccharides Wheat, barley, rye, onion, yas, white part of spring onion, garlic, shallots, artichokes, beetroot, fennel, peas, chicory, pistachio, cashews, legumes, lentils, and chickpeas   D Disaccharides Lactose Milk, custard, ice cream, and yogurt   M Monosaccharides \"Free fructose\" (fructose in excess of glucose) Apples, pears, mangoes, cherries, watermelon, asparagus, sugar snap peas, honey, high-fructose corn syrup   A And   P Polyols Sorbitol, mannitol, maltitol, and xylitol Apples, pears, apricots, cherries, nectarines, peaches, plums, watermelon, mushrooms, cauliflower, artificially sweetened chewing gum and confectionery   FODMAPs: fermentable oligosaccharides, disaccharides, monosaccharides, and polyols.  "

## 2022-05-18 NOTE — PROGRESS NOTES
.S: Feels well expect she is having more gas then likes. She has hx of irregular or non-regular bowl movements. She doesn't use laxatives but eats lots of fiber at times and still doesn't go often. She is not straining but does have hemorrhoids off an on. No rectal bleeding.  Baby active.  Denies uterine cramping, vaginal bleeding or leaking of fluid  O: Vitals: /66 (BP Location: Left arm, Cuff Size: Adult Regular)   Wt 79.8 kg (176 lb)   LMP 12/10/2021   BMI 26.77 kg/m    BMI= Body mass index is 26.77 kg/m .  Exam:  Constitutional: healthy, alert and no distress  Respiratory: respirations even and unlabored  Gastrointestinal: Abdomen soft, non-tender. Fundus measures appropriate for gestational age. Fetal heart tones hear without difficulty and within normal limits  : Deferred  Psychiatric: mentation appears normal and affect normal/bright  A:    Diagnosis Comments   1. Encounter for supervision of normal first pregnancy in second trimester     2. Family history of congenital heart defect       P: Discussed GCT/repeat RPR for next visit, handout provided, reminded of longer appointment.  Tdap next visit; reviewed CDC recommendations and partner/family vaccination recommended as well.  Need for Rhogam:  Yes, to be done next visit   Encouraged patient to call with any questions or concerns.  Return to clinic 4 weeks  Aline Bellamy CNM

## 2022-07-06 ENCOUNTER — PRENATAL OFFICE VISIT (OUTPATIENT)
Dept: MIDWIFE SERVICES | Facility: CLINIC | Age: 27
End: 2022-07-06
Payer: COMMERCIAL

## 2022-07-06 VITALS — DIASTOLIC BLOOD PRESSURE: 58 MMHG | WEIGHT: 179.2 LBS | BODY MASS INDEX: 27.26 KG/M2 | SYSTOLIC BLOOD PRESSURE: 108 MMHG

## 2022-07-06 DIAGNOSIS — Z13.1 SCREENING FOR DIABETES MELLITUS: ICD-10-CM

## 2022-07-06 DIAGNOSIS — O26.893 RH NEGATIVE STATUS DURING PREGNANCY IN THIRD TRIMESTER: ICD-10-CM

## 2022-07-06 DIAGNOSIS — Z67.91 RH NEGATIVE STATUS DURING PREGNANCY IN THIRD TRIMESTER: ICD-10-CM

## 2022-07-06 DIAGNOSIS — Z34.03 ENCOUNTER FOR SUPERVISION OF NORMAL FIRST PREGNANCY, THIRD TRIMESTER: Primary | ICD-10-CM

## 2022-07-06 DIAGNOSIS — Z23 NEED FOR TDAP VACCINATION: ICD-10-CM

## 2022-07-06 LAB
ANTIBODY SCREEN: NEGATIVE
ERYTHROCYTE [DISTWIDTH] IN BLOOD BY AUTOMATED COUNT: 12.8 % (ref 10–15)
GLUCOSE 1H P 50 G GLC PO SERPL-MCNC: 135 MG/DL (ref 70–129)
HCT VFR BLD AUTO: 36.1 % (ref 35–47)
HGB BLD-MCNC: 12.4 G/DL (ref 11.7–15.7)
MCH RBC QN AUTO: 30.6 PG (ref 26.5–33)
MCHC RBC AUTO-ENTMCNC: 34.3 G/DL (ref 31.5–36.5)
MCV RBC AUTO: 89 FL (ref 78–100)
PLATELET # BLD AUTO: 171 10E3/UL (ref 150–450)
RBC # BLD AUTO: 4.05 10E6/UL (ref 3.8–5.2)
SPECIMEN EXPIRATION DATE: NORMAL
WBC # BLD AUTO: 10.5 10E3/UL (ref 4–11)

## 2022-07-06 PROCEDURE — 99207 PR PRENATAL VISIT: CPT | Performed by: ADVANCED PRACTICE MIDWIFE

## 2022-07-06 PROCEDURE — 82950 GLUCOSE TEST: CPT | Performed by: ADVANCED PRACTICE MIDWIFE

## 2022-07-06 PROCEDURE — 90471 IMMUNIZATION ADMIN: CPT | Performed by: ADVANCED PRACTICE MIDWIFE

## 2022-07-06 PROCEDURE — 85027 COMPLETE CBC AUTOMATED: CPT | Performed by: ADVANCED PRACTICE MIDWIFE

## 2022-07-06 PROCEDURE — 86780 TREPONEMA PALLIDUM: CPT | Performed by: ADVANCED PRACTICE MIDWIFE

## 2022-07-06 PROCEDURE — 36415 COLL VENOUS BLD VENIPUNCTURE: CPT | Performed by: ADVANCED PRACTICE MIDWIFE

## 2022-07-06 PROCEDURE — 90715 TDAP VACCINE 7 YRS/> IM: CPT | Performed by: ADVANCED PRACTICE MIDWIFE

## 2022-07-06 PROCEDURE — 86850 RBC ANTIBODY SCREEN: CPT | Performed by: ADVANCED PRACTICE MIDWIFE

## 2022-07-06 PROCEDURE — 96372 THER/PROPH/DIAG INJ SC/IM: CPT | Performed by: ADVANCED PRACTICE MIDWIFE

## 2022-07-06 NOTE — PROGRESS NOTES
S: Feels a little overwhelmed and anxious about upcoming birth,  Baby active.  Denies uterine cramping, vaginal bleeding or leaking of fluid  O: Vitals: /58 (BP Location: Left arm, Cuff Size: Adult Regular)   Wt 81.3 kg (179 lb 3.2 oz)   LMP 12/10/2021   Breastfeeding No   BMI 27.26 kg/m    BMI= Body mass index is 27.26 kg/m .  Exam:  Constitutional: healthy, alert and no distress  Respiratory: respirations even and unlabored  Gastrointestinal: Abdomen soft, non-tender. Fundus measures appropriate for gestational age. Fetal heart tones hear without difficulty and within normal limits  : Deferred  Psychiatric: mentation appears normal and affect normal/bright  A:     ICD-10-CM    1. Encounter for supervision of normal first pregnancy, third trimester  Z34.03 CBC with platelets     Glucose tolerance gest screen 1 hour     Treponema Abs w Reflex to RPR and Titer     TDAP (ADACEL,BOOSTRIX)     rho(D) immune globulin (RHOGAM) injection 300 mcg     Antibody Screen - Red Cell     CBC with platelets     Glucose tolerance gest screen 1 hour     Treponema Abs w Reflex to RPR and Titer     Antibody Screen - Red Cell   2. Rh negative status during pregnancy  O26.899 rho(D) immune globulin (RHOGAM) injection 300 mcg    Z67.91 Antibody Screen - Red Cell     Antibody Screen - Red Cell   3. Need for Tdap vaccination  Z23 TDAP (ADACEL,BOOSTRIX)     P: GCT/repeat RPR today, handout provided.  Tdap given; reviewed CDC recommendations and partner/family vaccination recommended as well.  Need for Rhogam? Yes: .   Discussed patient options for postpartum contraception. Patient is planning condoms  Information provided for labor preparation  Encouraged patient to call with any questions or concerns.  Return to clinic 2 weeks    BEN Dobbins, SATHISH

## 2022-07-07 LAB — T PALLIDUM AB SER QL: NONREACTIVE

## 2022-07-08 ENCOUNTER — LAB (OUTPATIENT)
Dept: LAB | Facility: CLINIC | Age: 27
End: 2022-07-08
Payer: COMMERCIAL

## 2022-07-08 DIAGNOSIS — Z13.1 SCREENING FOR DIABETES MELLITUS: ICD-10-CM

## 2022-07-08 PROCEDURE — 82951 GLUCOSE TOLERANCE TEST (GTT): CPT

## 2022-07-08 PROCEDURE — 82952 GTT-ADDED SAMPLES: CPT

## 2022-07-08 PROCEDURE — 36415 COLL VENOUS BLD VENIPUNCTURE: CPT

## 2022-07-09 LAB
GESTATIONAL GTT 1 HR POST DOSE: 171 MG/DL (ref 60–179)
GESTATIONAL GTT 2 HR POST DOSE: 163 MG/DL (ref 60–154)
GESTATIONAL GTT 3 HR POST DOSE: 132 MG/DL (ref 60–139)
GLUCOSE P FAST SERPL-MCNC: 79 MG/DL (ref 60–94)

## 2022-07-25 ENCOUNTER — PRENATAL OFFICE VISIT (OUTPATIENT)
Dept: MIDWIFE SERVICES | Facility: CLINIC | Age: 27
End: 2022-07-25
Payer: COMMERCIAL

## 2022-07-25 VITALS — WEIGHT: 182 LBS | DIASTOLIC BLOOD PRESSURE: 58 MMHG | BODY MASS INDEX: 27.68 KG/M2 | SYSTOLIC BLOOD PRESSURE: 112 MMHG

## 2022-07-25 DIAGNOSIS — Z34.01 ENCOUNTER FOR SUPERVISION OF NORMAL FIRST PREGNANCY IN FIRST TRIMESTER: ICD-10-CM

## 2022-07-25 DIAGNOSIS — Z34.83 ENCOUNTER FOR SUPERVISION OF OTHER NORMAL PREGNANCY IN THIRD TRIMESTER: Primary | ICD-10-CM

## 2022-07-25 DIAGNOSIS — Z67.91 RH NEGATIVE STATE IN ANTEPARTUM PERIOD: ICD-10-CM

## 2022-07-25 DIAGNOSIS — O26.899 RH NEGATIVE STATE IN ANTEPARTUM PERIOD: ICD-10-CM

## 2022-07-25 PROCEDURE — 99207 PR PRENATAL VISIT: CPT | Performed by: ADVANCED PRACTICE MIDWIFE

## 2022-07-25 NOTE — PROGRESS NOTES
S: Feels well,  Baby active.  Denies uterine cramping, vaginal bleeding or leaking of fluid.    Has questions about upcoming labor. She has not taken any CBE courses yet but has looked into it.     O: Vitals: /58 (BP Location: Left arm, Cuff Size: Adult Regular)   Wt 82.6 kg (182 lb)   LMP 12/10/2021   BMI 27.68 kg/m    BMI= Body mass index is 27.68 kg/m .  Exam:  Constitutional: healthy, alert and no distress  Respiratory: respirations even and unlabored  Gastrointestinal: Abdomen soft, non-tender. Fundus measures appropriate for gestational age. Fetal heart tones hear without difficulty and within normal limits  : Deferred  Psychiatric: mentation appears normal and affect normal/bright  A/P:  1. (Z34.83) Encounter for supervision of other normal pregnancy in third trimester  (primary encounter diagnosis)  - Long discussion regarding upcoming birth. We discussed s/s labor, when to come to hospital, how the hospital rooms are laid out, and reminded that labor rooms do not have tubs. Discussed pain medication options. Monika hoping to avoid epidural but would consider if needed. Discussed immediate skin to skin, CNM call schedule. Recommended watching the free CBE videos through [employer] [facility].        Discussed plans for labor.     Encouraged patient to call with any questions or concerns.  Return to clinic 2 weeks    BEN Rodriguez, SAHTISH

## 2022-08-11 ENCOUNTER — PRENATAL OFFICE VISIT (OUTPATIENT)
Dept: MIDWIFE SERVICES | Facility: CLINIC | Age: 27
End: 2022-08-11
Payer: COMMERCIAL

## 2022-08-11 VITALS — DIASTOLIC BLOOD PRESSURE: 76 MMHG | BODY MASS INDEX: 28.14 KG/M2 | WEIGHT: 185 LBS | SYSTOLIC BLOOD PRESSURE: 122 MMHG

## 2022-08-11 DIAGNOSIS — Z34.83 ENCOUNTER FOR SUPERVISION OF OTHER NORMAL PREGNANCY IN THIRD TRIMESTER: Primary | ICD-10-CM

## 2022-08-11 PROCEDURE — 99207 PR PRENATAL VISIT: CPT | Performed by: ADVANCED PRACTICE MIDWIFE

## 2022-08-11 NOTE — PROGRESS NOTES
Feeling well.  Baby is active. Denies any leaking of fluid, vaginal bleeding, regular uterine contractions, or headaches or other concerns.  Long discussion about early labor and labor in the hospital.  Discussed GBS and cervical check at next visit.    Reviewed to call for contractions, loss of fluid, vaginal bleeding, decreased fetal movement or any other questions or concerns.    RTC in 2 weeks.  Gianna Wharton, BRITTANI, APRN, CNM

## 2022-08-17 ENCOUNTER — PRENATAL OFFICE VISIT (OUTPATIENT)
Dept: MIDWIFE SERVICES | Facility: CLINIC | Age: 27
End: 2022-08-17
Payer: COMMERCIAL

## 2022-08-17 VITALS — BODY MASS INDEX: 28.44 KG/M2 | SYSTOLIC BLOOD PRESSURE: 110 MMHG | DIASTOLIC BLOOD PRESSURE: 68 MMHG | WEIGHT: 187 LBS

## 2022-08-17 DIAGNOSIS — Z34.83 ENCOUNTER FOR SUPERVISION OF OTHER NORMAL PREGNANCY IN THIRD TRIMESTER: Primary | ICD-10-CM

## 2022-08-17 PROCEDURE — 87653 STREP B DNA AMP PROBE: CPT | Performed by: ADVANCED PRACTICE MIDWIFE

## 2022-08-17 PROCEDURE — 99207 PR PRENATAL VISIT: CPT | Performed by: ADVANCED PRACTICE MIDWIFE

## 2022-08-17 NOTE — NURSING NOTE
"Chief Complaint   Patient presents with     Prenatal Care     35w 5d       Initial /68 (BP Location: Right arm, Cuff Size: Adult Regular)   Wt 84.8 kg (187 lb)   LMP 12/10/2021   BMI 28.44 kg/m   Estimated body mass index is 28.44 kg/m  as calculated from the following:    Height as of 22: 1.727 m (5' 7.99\").    Weight as of this encounter: 84.8 kg (187 lb).  BP completed using cuff size: regular    Questioned patient about current smoking habits.  Pt. has never smoked.            "

## 2022-08-17 NOTE — PROGRESS NOTES
S: Feels well,  Baby active.  Denies uterine cramping, vaginal bleeding or leaking of fluid. No headache, increase in edema, no epigastric pain.     Long discussion regarding labor/delivery. Still planning to avoid epidural but open to is as needed. Asking about visitor policy - discussed 2 visitors per day. Discussed option for cervical exam and she declines.     O: Vitals: /68 (BP Location: Right arm, Cuff Size: Adult Regular)   Wt 84.8 kg (187 lb)   LMP 12/10/2021   BMI 28.44 kg/m    BMI= Body mass index is 28.44 kg/m .  Exam:  Constitutional: healthy, alert and no distress  Respiratory: respirations even and unlabored  Gastrointestinal: Abdomen soft, non-tender. Fundus measures appropriate for gestational age. Fetal heart tones hear without difficulty and within normal limits  : Deferred  Cephalic per BSUS  Psychiatric: mentation appears normal and affect normal/bright  A:     ICD-10-CM    1. Encounter for supervision of other normal pregnancy in third trimester  Z34.83 Group B strep PCR     Hemoglobin     P: Labor signs and symptoms discussed, aware of numbers to call  Discussed warning signs of PIH/preeclampsia and patient will monitor.  GBS screen completed. Discussed plans for labor    Encouraged patient to call with any questions or concerns.  Return to clinic 1 weeks    BEN Rodriguez, SATHISH

## 2022-08-18 LAB — GP B STREP DNA SPEC QL NAA+PROBE: NEGATIVE

## 2022-09-02 ENCOUNTER — PRENATAL OFFICE VISIT (OUTPATIENT)
Dept: MIDWIFE SERVICES | Facility: CLINIC | Age: 27
End: 2022-09-02
Payer: COMMERCIAL

## 2022-09-02 VITALS — SYSTOLIC BLOOD PRESSURE: 128 MMHG | DIASTOLIC BLOOD PRESSURE: 74 MMHG | WEIGHT: 191.4 LBS | BODY MASS INDEX: 29.11 KG/M2

## 2022-09-02 DIAGNOSIS — Z34.83 ENCOUNTER FOR SUPERVISION OF OTHER NORMAL PREGNANCY IN THIRD TRIMESTER: Primary | ICD-10-CM

## 2022-09-02 PROCEDURE — 99207 PR PRENATAL VISIT: CPT | Performed by: ADVANCED PRACTICE MIDWIFE

## 2022-09-02 NOTE — NURSING NOTE
"Chief Complaint   Patient presents with     Prenatal Care     38w 0d. +FM daily.        Initial /74 (BP Location: Right arm, Cuff Size: Adult Regular)   Wt 86.8 kg (191 lb 6.4 oz)   LMP 12/10/2021   Breastfeeding No   BMI 29.11 kg/m   Estimated body mass index is 29.11 kg/m  as calculated from the following:    Height as of 22: 1.727 m (5' 7.99\").    Weight as of this encounter: 86.8 kg (191 lb 6.4 oz).  BP completed using cuff size: regular    Questioned patient about current smoking habits.  Pt. has never smoked.               "

## 2022-09-04 ENCOUNTER — NURSE TRIAGE (OUTPATIENT)
Dept: NURSING | Facility: CLINIC | Age: 27
End: 2022-09-04

## 2022-09-04 ENCOUNTER — TELEPHONE (OUTPATIENT)
Dept: OBGYN | Facility: CLINIC | Age: 27
End: 2022-09-04

## 2022-09-04 NOTE — TELEPHONE ENCOUNTER
Returned Triage RN call regarding patient feeling dizzy with visual changes.  [place] and has [place] and a normal amount of food.  Working on drinking water now. Visual changes lasted 6-10 minutes and resolved. BP has been normal this pregnancy. I recommended patient go home, drink 1 liter of water in the next 2-3 hours, eat non-sugary foods, take 1000 mg of tylenol, and lay down.  If sx don't resolve after these measures I recommended patient come into triage for a BP check and PEC labs.  Triage RN to call and relay message to patient.     Joanne Santizo, BEN, CNM

## 2022-09-04 NOTE — TELEPHONE ENCOUNTER
OB Triage Call    Is patient's OB/Midwife with the [facility] or [facility]? [facility]- Proceed with triage     Reason for call: Patient is calling today with visual changes, some dizziness/woozy feeling     Assessment:Working this last week- States she has been sitting at the education building at the [facility] fair    Got light headed and she had a gap in her vision- lasted for about 6-10 min - states she feel these symptoms have resolved   States she overall feels woozy and fito of loopy    States she has drank some coffee, pumpkin pie, cereal and a breakfast sandwich  States she has had one bottle of water today and is on her second bottle    Baby is moving well  No hand or face swelling that she has noticed   Last /74  No pain  States she had some sensation of fullness in her head but not necessarily a headache    Plan: Page to the [facility] midwife for additional assistance    Patient's primary OB Provider is [facility].      Per protocol recommendations Consult needed for [facility] or Midwife.  Patient's primary OB is [facility]. Paged on-call midwife for patient's primary OB clinic (refer to where patient is seen as midwives may go to multiple locations) Joanne Santizo to call [facility] back at [phone].  Call returned at 1:25 pm  and advised on triage assessment. Does midwife recommend L&D evaluation? No- Per midwife on-call Joanne Santizo.      1st page at 1:11pm Joanne Santizo 1:25 pm call was returned        Direction: Patient should leave the [facility] - Drink another L of water over the next 2-3 hours (32 oz), another half L of water before she goes to bed, tylenol (1000mg of tylenol) and rest   - if symptoms persist, then to call back for triage and she can be evaluated in L&D    Called back and relayed information to patient - patient states that she has a long way back to her car to leave. Writer advised asking for help to get herself to the first aid building where they can check her BP and get her to  a place where she can lay down. Patient agrees to do so. Advised to call back if these interventions do not help and L&D will be called for eval for her. Patient agrees to this plan.     Is patient's delivering hospital on divert? Does not apply due to Patient given home care instructions      38w2d    Estimated Date of Delivery: Sep 16, 2022        OB History    Para Term  AB Living   1 0 0 0 0 0   SAB IAB Ectopic Multiple Live Births   0 0 0 0 0      # Outcome Date GA Lbr Edgar/2nd Weight Sex Delivery Anes PTL Lv   1 Current                No results found for: GBS       Sheree Moore RN 22 12:51 PM  [facility] Nurse Advisor    Reason for Disposition    [1] Eye pain AND [2] brief (now gone) blurred vision or visual changes    Additional Information    Negative: Followed getting substance in the eye    Negative: Foreign body or object is or was lodged in the eye    Negative: Followed an eye injury    Negative: Followed sun lamp or sun exposure (UV keratitis)    Negative: Yellow or green discharge (pus) in the eye    Negative: SEVERE headache    Negative: SEVERE eye pain    Negative: Complete loss of vision in 1 or both eyes    Negative: [1] Pregnant 20 or more weeks AND [2] new hand or face swelling    Negative: [1] Pregnant 20 or more weeks AND [2] upper abdominal pain lasts > 1 hour    Negative: [1] Pregnant 20 or more weeks AND [2] headache    Negative: [1] Pregnant 20 or more weeks AND [2] sudden weight gain (e.g., more than 3 lbs or 1.4 kg in one week)    Negative: [1] Pregnant 20 or more weeks AND [2] Systolic BP >= 140 OR Diastolic BP >= 90    Negative: [1] Pregnant 23 or more weeks AND [2] baby is moving less today (e.g., kick count < 5 in 1 hour or < 10 in 2 hours)    Negative: Double vision    Negative: Many floaters in the eye    Negative: Flashes of light  (Exception: brief from pressing on the eyeball)    Negative: [1] Blurred vision or visual changes AND [2] present  now AND [3] sudden onset or new (e.g., minutes, hours, days)    Negative: Patient sounds very sick or weak to the triager    Protocols used: PREGNANCY - VISION LOSS OR CHANGE-A-AH

## 2022-09-08 ENCOUNTER — PRENATAL OFFICE VISIT (OUTPATIENT)
Dept: MIDWIFE SERVICES | Facility: CLINIC | Age: 27
End: 2022-09-08
Payer: COMMERCIAL

## 2022-09-08 VITALS — WEIGHT: 191 LBS | SYSTOLIC BLOOD PRESSURE: 116 MMHG | BODY MASS INDEX: 29.05 KG/M2 | DIASTOLIC BLOOD PRESSURE: 76 MMHG

## 2022-09-08 DIAGNOSIS — Z34.03 ENCOUNTER FOR SUPERVISION OF NORMAL FIRST PREGNANCY IN THIRD TRIMESTER: Primary | ICD-10-CM

## 2022-09-08 PROCEDURE — 99207 PR PRENATAL VISIT: CPT | Performed by: ADVANCED PRACTICE MIDWIFE

## 2022-09-08 NOTE — NURSING NOTE
"Chief Complaint   Patient presents with     Prenatal Care     38w 6d       Initial /76 (BP Location: Left arm, Cuff Size: Adult Regular)   Wt 86.6 kg (191 lb)   LMP 12/10/2021   BMI 29.05 kg/m   Estimated body mass index is 29.05 kg/m  as calculated from the following:    Height as of 22: 1.727 m (5' 7.99\").    Weight as of this encounter: 86.6 kg (191 lb).  BP completed using cuff size: regular    Questioned patient about current smoking habits.  Pt. has never smoked.          The following HM Due: NONE    "

## 2022-09-08 NOTE — PROGRESS NOTES
S: Feels well,  Baby active.  Denies uterine cramping, vaginal bleeding or leaking of fluid. No headache, increase in edema, no epigastric pain.     Plans to have her mom and Mitchel as support during labor.  We discussed early labor coping and when to come to the hospital. She has been feeling good movement from baby and feels her back is along her left side now.     O: Vitals: /76 (BP Location: Left arm, Cuff Size: Adult Regular)   Wt 86.6 kg (191 lb)   LMP 12/10/2021   BMI 29.05 kg/m    BMI= Body mass index is 29.05 kg/m .  Exam:  Constitutional: healthy, alert and no distress  Respiratory: respirations even and unlabored  Gastrointestinal: Abdomen soft, non-tender. Fundus measures appropriate for gestational age. Fetal heart tones hear without difficulty and within normal limits  : Deferred  Psychiatric: mentation appears normal and affect normal/bright  A:     ICD-10-CM    1. Encounter for supervision of normal first pregnancy in third trimester  Z34.03      P: Labor signs and symptoms discussed, aware of numbers to call.  Discussed warning signs of PIH/preeclampsia and patient will monitor.  GBS screen completed. Discussed plans for labor. Discussed postdates options.  Encouraged patient to call with any questions or concerns.  Return to clinic 1 weeks    BEN Rodriguez, SATHISH

## 2022-09-14 ENCOUNTER — PRENATAL OFFICE VISIT (OUTPATIENT)
Dept: MIDWIFE SERVICES | Facility: CLINIC | Age: 27
End: 2022-09-14
Payer: COMMERCIAL

## 2022-09-14 VITALS — DIASTOLIC BLOOD PRESSURE: 78 MMHG | BODY MASS INDEX: 29.37 KG/M2 | WEIGHT: 193.1 LBS | SYSTOLIC BLOOD PRESSURE: 118 MMHG

## 2022-09-14 DIAGNOSIS — Z34.03 ENCOUNTER FOR SUPERVISION OF NORMAL FIRST PREGNANCY IN THIRD TRIMESTER: Primary | ICD-10-CM

## 2022-09-14 DIAGNOSIS — Z23 NEED FOR PROPHYLACTIC VACCINATION AND INOCULATION AGAINST INFLUENZA: ICD-10-CM

## 2022-09-14 PROCEDURE — 99207 PR PRENATAL VISIT: CPT | Performed by: ADVANCED PRACTICE MIDWIFE

## 2022-09-14 PROCEDURE — 90686 IIV4 VACC NO PRSV 0.5 ML IM: CPT | Performed by: ADVANCED PRACTICE MIDWIFE

## 2022-09-14 PROCEDURE — 90471 IMMUNIZATION ADMIN: CPT | Performed by: ADVANCED PRACTICE MIDWIFE

## 2022-09-14 NOTE — PROGRESS NOTES
S: Feels well overall, is tired. Baby active.  Denies uterine cramping, vaginal bleeding or leaking of fluid. No headache, increase in edema, no epigastric pain. Thinks baby will come this weekend.  O: Vitals: /78 (BP Location: Left arm, Cuff Size: Adult Regular)   Wt 87.6 kg (193 lb 1.6 oz)   LMP 12/10/2021   Breastfeeding No   BMI 29.37 kg/m    BMI= Body mass index is 29.37 kg/m .  Exam:  Constitutional: healthy, alert and no distress  Respiratory: respirations even and unlabored  Gastrointestinal: Abdomen soft, non-tender. Fundus measures appropriate for gestational age. Fetal heart tones hear without difficulty and within normal limits  : Declined exam  Psychiatric: mentation appears normal and affect normal/bright  A:     ICD-10-CM    1. Encounter for supervision of normal first pregnancy in third trimester  Z34.03      P: Labor signs and symptoms discussed, aware of numbers to call.  Discussed warning signs of PIH/preeclampsia and patient will monitor. Discussed plans for labor  Encouraged patient to call with any questions or concerns.  Return to clinic 5 days    BEN Paniagua CNM

## 2022-09-14 NOTE — NURSING NOTE
"Chief Complaint   Patient presents with     Prenatal Care     39w 5d, +FM daily       Initial /78 (BP Location: Left arm, Cuff Size: Adult Regular)   Wt 87.6 kg (193 lb 1.6 oz)   LMP 12/10/2021   Breastfeeding No   BMI 29.37 kg/m   Estimated body mass index is 29.37 kg/m  as calculated from the following:    Height as of 22: 1.727 m (5' 7.99\").    Weight as of this encounter: 87.6 kg (193 lb 1.6 oz).  BP completed using cuff size: regular    Questioned patient about current smoking habits.  Pt. has never smoked.             "

## 2022-09-19 ENCOUNTER — PRENATAL OFFICE VISIT (OUTPATIENT)
Dept: MIDWIFE SERVICES | Facility: CLINIC | Age: 27
End: 2022-09-19
Payer: COMMERCIAL

## 2022-09-19 VITALS — WEIGHT: 193 LBS | BODY MASS INDEX: 29.35 KG/M2 | SYSTOLIC BLOOD PRESSURE: 112 MMHG | DIASTOLIC BLOOD PRESSURE: 70 MMHG

## 2022-09-19 DIAGNOSIS — Z34.03 ENCOUNTER FOR SUPERVISION OF NORMAL FIRST PREGNANCY IN THIRD TRIMESTER: Primary | ICD-10-CM

## 2022-09-19 PROCEDURE — 99207 PR PRENATAL VISIT: CPT | Performed by: ADVANCED PRACTICE MIDWIFE

## 2022-09-19 PROCEDURE — 87653 STREP B DNA AMP PROBE: CPT | Performed by: ADVANCED PRACTICE MIDWIFE

## 2022-09-19 NOTE — PROGRESS NOTES
S: Feels well overall; ready for baby,  Baby active.  Denies uterine cramping, vaginal bleeding or leaking of fluid. No headache, increase in edema, no epigastric pain. Many questions related to IOL.  O: Vitals: /70 (Cuff Size: Adult Regular)   Wt 87.5 kg (193 lb)   LMP 12/10/2021   BMI 29.35 kg/m    BMI= Body mass index is 29.35 kg/m .  Exam:  Constitutional: healthy, alert and no distress  Respiratory: respirations even and unlabored  Gastrointestinal: Abdomen soft, non-tender. Fundus measures appropriate for gestational age. Fetal heart tones hear without difficulty and within normal limits  : 50/-3, mid, soft; membranes swept - normal symptoms from sweep reviewed  Psychiatric: mentation appears normal and affect normal/bright  A:     ICD-10-CM    1. Encounter for supervision of normal first pregnancy in third trimester  Z34.03 Asymptomatic COVID-19 Virus (Coronavirus) by PCR     Group B strep PCR     P: Labor signs and symptoms discussed, aware of numbers to call.  Discussed warning signs of PIH/preeclampsia and patient will monitor.  GBS screen completed.  Patient counseled on risks after 41 weeks of gestation.  The health risks for you and your fetus may increase if a pregnancy is late term or postterm, but problems occur in only a small number of postterm pregnancies. Most women who give birth after their due dates have uncomplicated labor and give birth to healthy babies. Risks associated with postterm pregnancy include the following:    -Stillbirth  -Macrosomia  -Postmaturity syndrome  -Meconium in the lungs of the fetus, which can cause serious breathing problems after birth  -Decreased amniotic fluid, which can cause the umbilical cord to pinch and restrict the flow of oxygen to the fetus  -Other risks include an increased chance of an assisted vaginal delivery or  delivery. There also is a higher chance of infection and postpartum hemorrhage when your pregnancy goes past your due  date.     Discussed postdates options and need for bi-weekly BPPs to start at 41 weeks.  Encouraged patient to call with any questions or concerns.  IOL scheduled for 9/23, Philly Howard and L&HUY notified    BEN Paniagua CNM

## 2022-09-21 LAB — GP B STREP DNA SPEC QL NAA+PROBE: NEGATIVE

## 2022-09-22 ENCOUNTER — HOSPITAL ENCOUNTER (OUTPATIENT)
Facility: CLINIC | Age: 27
Discharge: HOME | End: 2022-09-22
Attending: REGISTERED NURSE | Admitting: REGISTERED NURSE
Payer: COMMERCIAL

## 2022-09-22 ENCOUNTER — NURSE TRIAGE (OUTPATIENT)
Dept: NURSING | Facility: CLINIC | Age: 27
End: 2022-09-22

## 2022-09-22 ENCOUNTER — TELEPHONE (OUTPATIENT)
Dept: MIDWIFE SERVICES | Facility: CLINIC | Age: 27
End: 2022-09-22

## 2022-09-22 ENCOUNTER — HOSPITAL ENCOUNTER (INPATIENT)
Facility: CLINIC | Age: 27
LOS: 2 days | Discharge: HOME HEALTH | End: 2022-09-25
Attending: REGISTERED NURSE | Admitting: REGISTERED NURSE
Payer: COMMERCIAL

## 2022-09-22 VITALS
HEART RATE: 113 BPM | DIASTOLIC BLOOD PRESSURE: 74 MMHG | HEIGHT: 68 IN | BODY MASS INDEX: 29.25 KG/M2 | TEMPERATURE: 98.4 F | SYSTOLIC BLOOD PRESSURE: 122 MMHG | WEIGHT: 193 LBS

## 2022-09-22 DIAGNOSIS — Z67.91 RH NEGATIVE STATE IN ANTEPARTUM PERIOD: Primary | ICD-10-CM

## 2022-09-22 DIAGNOSIS — O26.899 RH NEGATIVE STATE IN ANTEPARTUM PERIOD: Primary | ICD-10-CM

## 2022-09-22 LAB — SARS-COV-2 RNA RESP QL NAA+PROBE: NEGATIVE

## 2022-09-22 PROCEDURE — U0005 INFEC AGEN DETEC AMPLI PROBE: HCPCS | Performed by: REGISTERED NURSE

## 2022-09-22 PROCEDURE — G0463 HOSPITAL OUTPT CLINIC VISIT: HCPCS

## 2022-09-22 PROCEDURE — C9803 HOPD COVID-19 SPEC COLLECT: HCPCS

## 2022-09-22 ASSESSMENT — ACTIVITIES OF DAILY LIVING (ADL): ADLS_ACUITY_SCORE: 33

## 2022-09-22 NOTE — PROGRESS NOTES
"[facility] CNM TRIAGE NOTE    Monika Yan is a 27 year old  with and IUP at 40w6d who presents with complaint of contractions. Here with Mitchel and mother.     CX's are still irregular.  Painful.  Can be 5-6 min apart.  Having some pink discharge      Patient states baby is active.  Denies ROM   Denies vaginal bleeding  Present OB History at [facility] with the CNMs.     Problems this pregnancy:   1. Rh negative status    ROS:  Patient is alert and oriented      PHYSICAL EXAM:  /82   Pulse 113   Temp 98.4  F (36.9  C)   Ht 1.727 m (5' 8\")   Wt 87.5 kg (193 lb)   LMP 12/10/2021   BMI 29.35 kg/m      FHT's 135 with moderate variability  Accelerations: present   Decelerations:  absent        Contractions: Pt is carmelo every 2-5 minutes, lasting 50-90 seconds and palpates mild. Is not aware of all contractions.     Abdomen: gravid, non-tender  Bloody show: not present on exam  Cervix: 3/ 70% / Mid/ soft/ -2  Membranes are intact       ASSESSMENT :   27 year old  with garcia IUP 40w6d in early labor  NST  reactive  GBS negative and membranes intact        PLAN:    - When asked about pre-e symptoms given slight BP elevation she so noted recent headaches, improved with rest and water. Also had visual changes 2 times in pregnancy, one in early pregnancy and [place] but was thought to be due to dehydration. Recheck of /74. Denies current visual changes, intractable headache, or RUQ pain.   - Reviewed early labor status. Discussed walking and reassessing cervix or laboring at home. She opts to go home.   - Was supposed to have cervical ripening scheduled for tomorrow night for post-dates but nursing team does not see this scheduled.  Reviewed that this will probably not be needed given change of cervix and early labor but that in the unlikely case that labor stops then we can get her on the schedule. She declines induction for tomorrow. If labor " stops and she still does not wish induction, would need to return for clinic visits every 3 days for prenatal care and BPPs. Will pass this on to oncoming midwife tomorrow if relevant in order to get her scheduled appropriately.   - collect covid swab now so it doesn't need to be collected when patient is more uncomfortable in active labor    Discharge to home    Teaching done r/t to s/s of labor, SROM, decreased fetal movement, comfort measures in third trimester.  Instructed to please refer to the discharge handouts, the RN triage line or on-call CNM for any questions or concerns.  Pt verbalizes understanding and agreement with current plan of care.    BEN ValdesM

## 2022-09-22 NOTE — TELEPHONE ENCOUNTER
Ctx started last evening around 8.  Slept off and on last night.  Ctx now every 6-30 minutes for the past two hours.  Some are more painful than others, some starting to take her breath away briefly.    Denies LOF or bleeding, notes mucous discharge.  Normal FM.    Advised to monitor at home for now, CB with SROM, bright red bleeding or if ctx are every 6-10 minutes for 2 hours or more.  She is 40 minutes away, concerned with getting here in time but not too early when she does come in.    Kianna Hall RN

## 2022-09-22 NOTE — PLAN OF CARE
Data: Patient presented to [facility]: 2022  5:35 PM.  Reason for maternal/fetal assessment is uterine contractions. Patient reports sporadic contractions since last evening.  Patient is a .  Prenatal record reviewed. Pregnancy has been uncomplicated..  Gestational Age 40w6d. VSS. Fetal movement active. Patient denies leaking of vaginal fluid/rupture of membranes, vaginal bleeding, abdominal pain, pelvic pressure, nausea, vomiting, headache. Support person is present.   Action: Verbal consent for EFM. Triage assessment completed. Bill of rights reviewed.  Response: Patient verbalized agreement with plan. Will contact EVANGELINA Pickett with update and for further orders.

## 2022-09-22 NOTE — DISCHARGE INSTRUCTIONS
Discharge Instruction for Undelivered Patients      You were seen for: Labor Assessment  We Consulted: SATHISH Felix  You had (Test or Medicine):fetal and uterine monitoring, cervical exam     Diet:   You may eat meals and snacks.     Activity:  Call your doctor or nurse midwife if your baby is moving less than usual.     Call your provider if you notice:  Swelling in your face or increased swelling in your hands or legs.  Headaches that are not relieved by Tylenol (acetaminophen).  Changes in your vision (blurring: seeing spots or stars.)  Nausea (sick to your stomach) and vomiting (throwing up).   Weight gain of 5 pounds or more per week.  Heartburn that doesn't go away.  Signs of bladder infection: pain when you urinate (use the toilet), need to go more often and more urgently.  The bag of washburn (rupture of membranes) breaks, or you notice leaking in your underwear.  Bright red blood in your underwear.  Abdominal (lower belly) or stomach pain.  For first baby: Contractions (tightening) less than 5 minutes apart for one hour or more.  Increase or change in vaginal discharge (note the color and amount)      Follow-up:  If you don't came back tonight or tomorrow in active labor, please call the clinic to make an appointment since you will be post 41 weeks.

## 2022-09-22 NOTE — TELEPHONE ENCOUNTER
Patient callinw6d    CX's are still irregular.  Painful.  Can be 5-6 min apart.  Having some pink discharge.  State has an induction scheduled for tomorrow. (not scheduled)  Patient advised to go to L&D.  [facility] L&D notified.  Page sent to Midwife on call.  Ernestine Lockhart RN

## 2022-09-22 NOTE — PROVIDER NOTIFICATION
09/22/22 0423   Provider Notification   Provider Name/Title SATHISH Felix   Method of Notification Electronic Page     Spoke to SATHISH Felix on the phone. Informed her of patients pertinent history, arrival and current complains of irregular contractions and pink discharge. Elvira is going to come to bedside and see the patient.

## 2022-09-23 ENCOUNTER — ANESTHESIA EVENT (OUTPATIENT)
Dept: OBGYN | Facility: CLINIC | Age: 27
End: 2022-09-23
Payer: COMMERCIAL

## 2022-09-23 ENCOUNTER — ANESTHESIA (OUTPATIENT)
Dept: OBGYN | Facility: CLINIC | Age: 27
End: 2022-09-23
Payer: COMMERCIAL

## 2022-09-23 LAB
ABO/RH(D): ABNORMAL
ABO/RH(D): NORMAL
ANTIBODY ID: NORMAL
ANTIBODY SCREEN: POSITIVE
ERYTHROCYTE [DISTWIDTH] IN BLOOD BY AUTOMATED COUNT: 13.2 % (ref 10–15)
FETAL BLEED SCREEN: NORMAL
HCT VFR BLD AUTO: 38.3 % (ref 35–47)
HGB BLD-MCNC: 12.8 G/DL (ref 11.7–15.7)
MCH RBC QN AUTO: 29.7 PG (ref 26.5–33)
MCHC RBC AUTO-ENTMCNC: 33.4 G/DL (ref 31.5–36.5)
MCV RBC AUTO: 89 FL (ref 78–100)
PLATELET # BLD AUTO: 187 10E3/UL (ref 150–450)
RBC # BLD AUTO: 4.31 10E6/UL (ref 3.8–5.2)
SPECIMEN EXPIRATION DATE: ABNORMAL
SPECIMEN EXPIRATION DATE: NORMAL
SPECIMEN EXPIRATION DATE: NORMAL
WBC # BLD AUTO: 14.6 10E3/UL (ref 4–11)

## 2022-09-23 PROCEDURE — 722N000001 HC LABOR CARE VAGINAL DELIVERY SINGLE

## 2022-09-23 PROCEDURE — 59400 OBSTETRICAL CARE: CPT | Performed by: ADVANCED PRACTICE MIDWIFE

## 2022-09-23 PROCEDURE — 258N000003 HC RX IP 258 OP 636: Performed by: REGISTERED NURSE

## 2022-09-23 PROCEDURE — 250N000011 HC RX IP 250 OP 636: Performed by: REGISTERED NURSE

## 2022-09-23 PROCEDURE — 370N000003 HC ANESTHESIA WARD SERVICE

## 2022-09-23 PROCEDURE — 120N000001 HC R&B MED SURG/OB

## 2022-09-23 PROCEDURE — 250N000013 HC RX MED GY IP 250 OP 250 PS 637: Performed by: ADVANCED PRACTICE MIDWIFE

## 2022-09-23 PROCEDURE — 258N000003 HC RX IP 258 OP 636: Performed by: ANESTHESIOLOGY

## 2022-09-23 PROCEDURE — 59025 FETAL NON-STRESS TEST: CPT | Mod: 26 | Performed by: ADVANCED PRACTICE MIDWIFE

## 2022-09-23 PROCEDURE — 250N000011 HC RX IP 250 OP 636: Performed by: ANESTHESIOLOGY

## 2022-09-23 PROCEDURE — 10907ZC DRAINAGE OF AMNIOTIC FLUID, THERAPEUTIC FROM PRODUCTS OF CONCEPTION, VIA NATURAL OR ARTIFICIAL OPENING: ICD-10-PCS | Performed by: ADVANCED PRACTICE MIDWIFE

## 2022-09-23 PROCEDURE — 36415 COLL VENOUS BLD VENIPUNCTURE: CPT | Performed by: ADVANCED PRACTICE MIDWIFE

## 2022-09-23 PROCEDURE — 86901 BLOOD TYPING SEROLOGIC RH(D): CPT | Performed by: REGISTERED NURSE

## 2022-09-23 PROCEDURE — 0KQM0ZZ REPAIR PERINEUM MUSCLE, OPEN APPROACH: ICD-10-PCS | Performed by: ADVANCED PRACTICE MIDWIFE

## 2022-09-23 PROCEDURE — 85461 HEMOGLOBIN FETAL: CPT | Performed by: ADVANCED PRACTICE MIDWIFE

## 2022-09-23 PROCEDURE — 85027 COMPLETE CBC AUTOMATED: CPT | Performed by: REGISTERED NURSE

## 2022-09-23 PROCEDURE — 250N000009 HC RX 250: Performed by: ADVANCED PRACTICE MIDWIFE

## 2022-09-23 PROCEDURE — 00HU33Z INSERTION OF INFUSION DEVICE INTO SPINAL CANAL, PERCUTANEOUS APPROACH: ICD-10-PCS | Performed by: ANESTHESIOLOGY

## 2022-09-23 PROCEDURE — 86870 RBC ANTIBODY IDENTIFICATION: CPT | Performed by: REGISTERED NURSE

## 2022-09-23 PROCEDURE — 86850 RBC ANTIBODY SCREEN: CPT | Performed by: REGISTERED NURSE

## 2022-09-23 PROCEDURE — 36415 COLL VENOUS BLD VENIPUNCTURE: CPT | Performed by: REGISTERED NURSE

## 2022-09-23 PROCEDURE — 250N000013 HC RX MED GY IP 250 OP 250 PS 637: Performed by: REGISTERED NURSE

## 2022-09-23 PROCEDURE — 3E0R3BZ INTRODUCTION OF ANESTHETIC AGENT INTO SPINAL CANAL, PERCUTANEOUS APPROACH: ICD-10-PCS | Performed by: ANESTHESIOLOGY

## 2022-09-23 PROCEDURE — 0UQMXZZ REPAIR VULVA, EXTERNAL APPROACH: ICD-10-PCS | Performed by: ADVANCED PRACTICE MIDWIFE

## 2022-09-23 RX ORDER — OXYTOCIN 10 [USP'U]/ML
10 INJECTION, SOLUTION INTRAMUSCULAR; INTRAVENOUS
Status: DISCONTINUED | OUTPATIENT
Start: 2022-09-23 | End: 2022-09-23 | Stop reason: HOSPADM

## 2022-09-23 RX ORDER — LIDOCAINE 40 MG/G
CREAM TOPICAL
Status: DISCONTINUED | OUTPATIENT
Start: 2022-09-23 | End: 2022-09-23 | Stop reason: HOSPADM

## 2022-09-23 RX ORDER — EPHEDRINE SULFATE 50 MG/ML
5 INJECTION, SOLUTION INTRAMUSCULAR; INTRAVENOUS; SUBCUTANEOUS
Status: DISCONTINUED | OUTPATIENT
Start: 2022-09-23 | End: 2022-09-23 | Stop reason: HOSPADM

## 2022-09-23 RX ORDER — MISOPROSTOL 200 UG/1
800 TABLET ORAL
Status: DISCONTINUED | OUTPATIENT
Start: 2022-09-23 | End: 2022-09-25 | Stop reason: HOSPADM

## 2022-09-23 RX ORDER — OXYTOCIN/0.9 % SODIUM CHLORIDE 30/500 ML
340 PLASTIC BAG, INJECTION (ML) INTRAVENOUS CONTINUOUS PRN
Status: DISCONTINUED | OUTPATIENT
Start: 2022-09-23 | End: 2022-09-23 | Stop reason: HOSPADM

## 2022-09-23 RX ORDER — OXYTOCIN/0.9 % SODIUM CHLORIDE 30/500 ML
100-340 PLASTIC BAG, INJECTION (ML) INTRAVENOUS CONTINUOUS PRN
Status: DISCONTINUED | OUTPATIENT
Start: 2022-09-23 | End: 2022-09-25 | Stop reason: CLARIF

## 2022-09-23 RX ORDER — OXYTOCIN 10 [USP'U]/ML
10 INJECTION, SOLUTION INTRAMUSCULAR; INTRAVENOUS
Status: DISCONTINUED | OUTPATIENT
Start: 2022-09-23 | End: 2022-09-25 | Stop reason: CLARIF

## 2022-09-23 RX ORDER — MISOPROSTOL 200 UG/1
400 TABLET ORAL
Status: DISCONTINUED | OUTPATIENT
Start: 2022-09-23 | End: 2022-09-23 | Stop reason: HOSPADM

## 2022-09-23 RX ORDER — CARBOPROST TROMETHAMINE 250 UG/ML
250 INJECTION, SOLUTION INTRAMUSCULAR
Status: DISCONTINUED | OUTPATIENT
Start: 2022-09-23 | End: 2022-09-25 | Stop reason: HOSPADM

## 2022-09-23 RX ORDER — NALOXONE HYDROCHLORIDE 0.4 MG/ML
0.2 INJECTION, SOLUTION INTRAMUSCULAR; INTRAVENOUS; SUBCUTANEOUS
Status: DISCONTINUED | OUTPATIENT
Start: 2022-09-23 | End: 2022-09-23 | Stop reason: HOSPADM

## 2022-09-23 RX ORDER — METOCLOPRAMIDE 10 MG/1
10 TABLET ORAL EVERY 6 HOURS PRN
Status: DISCONTINUED | OUTPATIENT
Start: 2022-09-23 | End: 2022-09-23 | Stop reason: HOSPADM

## 2022-09-23 RX ORDER — IBUPROFEN 800 MG/1
800 TABLET, FILM COATED ORAL
Status: DISCONTINUED | OUTPATIENT
Start: 2022-09-23 | End: 2022-09-23

## 2022-09-23 RX ORDER — ONDANSETRON 2 MG/ML
4 INJECTION INTRAMUSCULAR; INTRAVENOUS EVERY 6 HOURS PRN
Status: DISCONTINUED | OUTPATIENT
Start: 2022-09-23 | End: 2022-09-23

## 2022-09-23 RX ORDER — MISOPROSTOL 200 UG/1
800 TABLET ORAL
Status: DISCONTINUED | OUTPATIENT
Start: 2022-09-23 | End: 2022-09-23 | Stop reason: HOSPADM

## 2022-09-23 RX ORDER — CITRIC ACID/SODIUM CITRATE 334-500MG
30 SOLUTION, ORAL ORAL
Status: DISCONTINUED | OUTPATIENT
Start: 2022-09-23 | End: 2022-09-23 | Stop reason: HOSPADM

## 2022-09-23 RX ORDER — ONDANSETRON 4 MG/1
4 TABLET, ORALLY DISINTEGRATING ORAL EVERY 6 HOURS PRN
Status: DISCONTINUED | OUTPATIENT
Start: 2022-09-23 | End: 2022-09-23

## 2022-09-23 RX ORDER — OXYTOCIN/0.9 % SODIUM CHLORIDE 30/500 ML
340 PLASTIC BAG, INJECTION (ML) INTRAVENOUS CONTINUOUS PRN
Status: DISCONTINUED | OUTPATIENT
Start: 2022-09-23 | End: 2022-09-25 | Stop reason: HOSPADM

## 2022-09-23 RX ORDER — BISACODYL 10 MG
10 SUPPOSITORY, RECTAL RECTAL DAILY PRN
Status: DISCONTINUED | OUTPATIENT
Start: 2022-09-23 | End: 2022-09-25 | Stop reason: HOSPADM

## 2022-09-23 RX ORDER — NALOXONE HYDROCHLORIDE 0.4 MG/ML
0.4 INJECTION, SOLUTION INTRAMUSCULAR; INTRAVENOUS; SUBCUTANEOUS
Status: DISCONTINUED | OUTPATIENT
Start: 2022-09-23 | End: 2022-09-23 | Stop reason: HOSPADM

## 2022-09-23 RX ORDER — NALBUPHINE HYDROCHLORIDE 10 MG/ML
2.5-5 INJECTION, SOLUTION INTRAMUSCULAR; INTRAVENOUS; SUBCUTANEOUS EVERY 6 HOURS PRN
Status: DISCONTINUED | OUTPATIENT
Start: 2022-09-23 | End: 2022-09-25 | Stop reason: HOSPADM

## 2022-09-23 RX ORDER — METHYLERGONOVINE MALEATE 0.2 MG/ML
200 INJECTION INTRAVENOUS
Status: DISCONTINUED | OUTPATIENT
Start: 2022-09-23 | End: 2022-09-25 | Stop reason: HOSPADM

## 2022-09-23 RX ORDER — ONDANSETRON 2 MG/ML
4 INJECTION INTRAMUSCULAR; INTRAVENOUS EVERY 6 HOURS PRN
Status: DISCONTINUED | OUTPATIENT
Start: 2022-09-23 | End: 2022-09-23 | Stop reason: HOSPADM

## 2022-09-23 RX ORDER — ACETAMINOPHEN 325 MG/1
650 TABLET ORAL EVERY 4 HOURS PRN
Status: DISCONTINUED | OUTPATIENT
Start: 2022-09-23 | End: 2022-09-25 | Stop reason: HOSPADM

## 2022-09-23 RX ORDER — MISOPROSTOL 200 UG/1
400 TABLET ORAL
Status: DISCONTINUED | OUTPATIENT
Start: 2022-09-23 | End: 2022-09-25 | Stop reason: HOSPADM

## 2022-09-23 RX ORDER — FENTANYL CITRATE 50 UG/ML
50 INJECTION, SOLUTION INTRAMUSCULAR; INTRAVENOUS EVERY 30 MIN PRN
Status: DISCONTINUED | OUTPATIENT
Start: 2022-09-23 | End: 2022-09-23 | Stop reason: HOSPADM

## 2022-09-23 RX ORDER — KETOROLAC TROMETHAMINE 30 MG/ML
30 INJECTION, SOLUTION INTRAMUSCULAR; INTRAVENOUS
Status: DISCONTINUED | OUTPATIENT
Start: 2022-09-23 | End: 2022-09-23

## 2022-09-23 RX ORDER — DOCUSATE SODIUM 100 MG/1
100 CAPSULE, LIQUID FILLED ORAL DAILY
Status: DISCONTINUED | OUTPATIENT
Start: 2022-09-23 | End: 2022-09-25 | Stop reason: HOSPADM

## 2022-09-23 RX ORDER — PROCHLORPERAZINE 25 MG
25 SUPPOSITORY, RECTAL RECTAL EVERY 12 HOURS PRN
Status: DISCONTINUED | OUTPATIENT
Start: 2022-09-23 | End: 2022-09-23 | Stop reason: HOSPADM

## 2022-09-23 RX ORDER — BUPIVACAINE HYDROCHLORIDE 2.5 MG/ML
INJECTION, SOLUTION EPIDURAL; INFILTRATION; INTRACAUDAL
Status: COMPLETED | OUTPATIENT
Start: 2022-09-23 | End: 2022-09-23

## 2022-09-23 RX ORDER — METOCLOPRAMIDE HYDROCHLORIDE 5 MG/ML
10 INJECTION INTRAMUSCULAR; INTRAVENOUS EVERY 6 HOURS PRN
Status: DISCONTINUED | OUTPATIENT
Start: 2022-09-23 | End: 2022-09-23 | Stop reason: HOSPADM

## 2022-09-23 RX ORDER — ACETAMINOPHEN 325 MG/1
650 TABLET ORAL EVERY 4 HOURS PRN
Status: DISCONTINUED | OUTPATIENT
Start: 2022-09-23 | End: 2022-09-23 | Stop reason: HOSPADM

## 2022-09-23 RX ORDER — METHYLERGONOVINE MALEATE 0.2 MG/ML
200 INJECTION INTRAVENOUS
Status: DISCONTINUED | OUTPATIENT
Start: 2022-09-23 | End: 2022-09-23 | Stop reason: HOSPADM

## 2022-09-23 RX ORDER — IBUPROFEN 800 MG/1
800 TABLET, FILM COATED ORAL EVERY 6 HOURS PRN
Status: DISCONTINUED | OUTPATIENT
Start: 2022-09-23 | End: 2022-09-25 | Stop reason: HOSPADM

## 2022-09-23 RX ORDER — OXYTOCIN 10 [USP'U]/ML
10 INJECTION, SOLUTION INTRAMUSCULAR; INTRAVENOUS
Status: DISCONTINUED | OUTPATIENT
Start: 2022-09-23 | End: 2022-09-25 | Stop reason: HOSPADM

## 2022-09-23 RX ORDER — MODIFIED LANOLIN
OINTMENT (GRAM) TOPICAL
Status: DISCONTINUED | OUTPATIENT
Start: 2022-09-23 | End: 2022-09-25 | Stop reason: HOSPADM

## 2022-09-23 RX ORDER — TRANEXAMIC ACID 10 MG/ML
1 INJECTION, SOLUTION INTRAVENOUS EVERY 30 MIN PRN
Status: DISCONTINUED | OUTPATIENT
Start: 2022-09-23 | End: 2022-09-25 | Stop reason: HOSPADM

## 2022-09-23 RX ORDER — ONDANSETRON 4 MG/1
4 TABLET, ORALLY DISINTEGRATING ORAL EVERY 6 HOURS PRN
Status: DISCONTINUED | OUTPATIENT
Start: 2022-09-23 | End: 2022-09-23 | Stop reason: HOSPADM

## 2022-09-23 RX ORDER — PROCHLORPERAZINE MALEATE 10 MG
10 TABLET ORAL EVERY 6 HOURS PRN
Status: DISCONTINUED | OUTPATIENT
Start: 2022-09-23 | End: 2022-09-23 | Stop reason: HOSPADM

## 2022-09-23 RX ORDER — HYDROCORTISONE 25 MG/G
CREAM TOPICAL 3 TIMES DAILY PRN
Status: DISCONTINUED | OUTPATIENT
Start: 2022-09-23 | End: 2022-09-25 | Stop reason: HOSPADM

## 2022-09-23 RX ORDER — TRANEXAMIC ACID 10 MG/ML
1 INJECTION, SOLUTION INTRAVENOUS EVERY 30 MIN PRN
Status: DISCONTINUED | OUTPATIENT
Start: 2022-09-23 | End: 2022-09-23 | Stop reason: HOSPADM

## 2022-09-23 RX ORDER — OXYTOCIN/0.9 % SODIUM CHLORIDE 30/500 ML
1-24 PLASTIC BAG, INJECTION (ML) INTRAVENOUS CONTINUOUS
Status: DISCONTINUED | OUTPATIENT
Start: 2022-09-23 | End: 2022-09-23 | Stop reason: HOSPADM

## 2022-09-23 RX ORDER — CARBOPROST TROMETHAMINE 250 UG/ML
250 INJECTION, SOLUTION INTRAMUSCULAR
Status: DISCONTINUED | OUTPATIENT
Start: 2022-09-23 | End: 2022-09-23 | Stop reason: HOSPADM

## 2022-09-23 RX ADMIN — KETOROLAC TROMETHAMINE 30 MG: 30 INJECTION, SOLUTION INTRAMUSCULAR at 16:46

## 2022-09-23 RX ADMIN — Medication 2 MILLI-UNITS/MIN: at 11:30

## 2022-09-23 RX ADMIN — MISOPROSTOL 800 MCG: 200 TABLET ORAL at 16:23

## 2022-09-23 RX ADMIN — ACETAMINOPHEN 650 MG: 325 TABLET, FILM COATED ORAL at 20:26

## 2022-09-23 RX ADMIN — BUPIVACAINE HYDROCHLORIDE 8 ML: 2.5 INJECTION, SOLUTION EPIDURAL; INFILTRATION; INTRACAUDAL at 05:30

## 2022-09-23 RX ADMIN — SODIUM CHLORIDE, POTASSIUM CHLORIDE, SODIUM LACTATE AND CALCIUM CHLORIDE 1000 ML: 600; 310; 30; 20 INJECTION, SOLUTION INTRAVENOUS at 04:31

## 2022-09-23 RX ADMIN — Medication: at 05:48

## 2022-09-23 RX ADMIN — METHYLERGONOVINE MALEATE 200 MCG: 0.2 INJECTION, SOLUTION INTRAMUSCULAR; INTRAVENOUS at 16:21

## 2022-09-23 RX ADMIN — Medication: at 14:11

## 2022-09-23 RX ADMIN — SODIUM CHLORIDE, POTASSIUM CHLORIDE, SODIUM LACTATE AND CALCIUM CHLORIDE 500 ML: 600; 310; 30; 20 INJECTION, SOLUTION INTRAVENOUS at 15:50

## 2022-09-23 RX ADMIN — DOCUSATE SODIUM 100 MG: 100 CAPSULE, LIQUID FILLED ORAL at 20:25

## 2022-09-23 ASSESSMENT — ACTIVITIES OF DAILY LIVING (ADL)
ADLS_ACUITY_SCORE: 18
CHANGE_IN_FUNCTIONAL_STATUS_SINCE_ONSET_OF_CURRENT_ILLNESS/INJURY: NO
DIFFICULTY_EATING/SWALLOWING: NO
ADLS_ACUITY_SCORE: 18
DOING_ERRANDS_INDEPENDENTLY_DIFFICULTY: NO
FALL_HISTORY_WITHIN_LAST_SIX_MONTHS: NO
WALKING_OR_CLIMBING_STAIRS_DIFFICULTY: NO
TOILETING_ISSUES: NO
DIFFICULTY_COMMUNICATING: NO
ADLS_ACUITY_SCORE: 18
WEAR_GLASSES_OR_BLIND: NO
ADLS_ACUITY_SCORE: 18
HEARING_DIFFICULTY_OR_DEAF: NO
ADLS_ACUITY_SCORE: 35
ADLS_ACUITY_SCORE: 18
CONCENTRATING,_REMEMBERING_OR_MAKING_DECISIONS_DIFFICULTY: NO
ADLS_ACUITY_SCORE: 18
ADLS_ACUITY_SCORE: 18
DRESSING/BATHING_DIFFICULTY: NO
ADLS_ACUITY_SCORE: 18
ADLS_ACUITY_SCORE: 18

## 2022-09-23 NOTE — PROGRESS NOTES
SATHISH PROGRESS NOTE    SUBJECTIVE:    Monika is resting comfortably with epidural. Mitchel and mother Hiral are supportive at the bedside.     OBJECTIVE:  /56   Temp 97.5  F (36.4  C) (Oral)   Resp 16   LMP 12/10/2021   SpO2 94%     Fetal heart tones: Baseline 120   Variability: moderate  Accelerations: present  Decelerations: absent    Contractions: Pt is carmelo every 5 minutes, lasting 90 seconds and palpates moderate    Cervix: 6.5cm / 90% / 0, Vtx  ROM: clear/bloody fluid    Pitocin- none  Antibiotics- none  Cervical ripening: N/A    ASSESSMENT:  IUP @ 41w0d active labor , minimal change  GBS- negative  Rh neg  Reassuring fetal status  Ruptured x2 hrs, afebrile      PLAN:   Minimal cervical change s/p AROM 2 hrs ago.   Recommended pitocin for labor augmentation as contractions have remained relatively spaced out (q5mins) despite AROM. Reviewed risks\benefits. Pt agrees with plan of care.   Continue facilitating position change to encourage fetal descent     Anticipate   Labor augmentation with Pitocin  reevaluate in 2 hours/PRN    Philly Howard CNM

## 2022-09-23 NOTE — PROVIDER NOTIFICATION
09/23/22 0840   Provider Notification   Provider Name/Title Philly BOWER   Method of Notification At Bedside     SATHISH Fraga at bedside to see patient. Discussed AROM procedure for spacing contractions. Patient in agreement. AROM for small amount bloody/mec stained fluid. SVE 6/90/0

## 2022-09-23 NOTE — PROGRESS NOTES
"ZOE[facility] PROGRESS NOTE    SUBJECTIVE:  Monika reports a \"dull-ache\" in a strip along the left side of her belly during contractions. Continues to feel good fetal movement and states movements feel \"lower\".     OBJECTIVE:  BP 98/54   Temp 97.9  F (36.6  C) (Oral)   Resp 16   LMP 12/10/2021   SpO2 94%     Fetal heart tones: Baseline 115  Variability: moderate  Accelerations: present  Decelerations: intermittent early, variable, late     Contractions: Pt is carmelo every 2 minutes, lasting 90 seconds and palpates moderate    Cervix: 10cm / 100% / +1/+2, Vtx  ROM: light meconium fluid     Pitocin- 4 mu/min.  Antibiotics- none  Cervical ripening: N/A    ASSESSMENT:  IUP @ 41w0d active labor and good progress   GBS- negative  Rh neg  Reassuring fetal status  Ruptured x5 hrs, afebrile     PLAN:   Completely dilated without urge to push. Discussed option to begin pushing or labor down x1 hr. Discussed risks/benefits. She desires to labor down. Monika, Mitchel, and Hiral agree with plan of care. Placed in throne position.       Anticipate   reevaluate in 1 hour/PRN    Philly Howard CNM    "

## 2022-09-23 NOTE — PLAN OF CARE
CNM was at bedside to see patient and talk about plan of care. Patient is in early labor and agrees to discharge at this time. COVID swab done prior to discharge so result is on file prior to admission. Patient instruction to call clinic if she doesn't return in labor tonight or tomorrow morning to be seen due to post date. Patient states understanding. Patient discharged ambulatory.

## 2022-09-23 NOTE — PROGRESS NOTES
[facility]  Infectious Disease Progress Note          Assessment and Plan:   IMPRESSION:   1.  A 52-year-old female with acute sepsis, underlying paraplegia and a chronic wound, but this appears to be respiratory infection, has infiltrates and significant pleural fluid, initially felt to have major pericardial fluid, but this turns out to be a false result, likely conventional community-acquired pneumonia, although at risk for other pathogens.   2.  Paraplegia including chronic neurogenic bladder with ileal diversion and ileostomy.   3.  Chronic pain syndrome.   4.  Chronic bilateral sacral decubitus wounds, never been diagnosed with osteomyelitis, previous infection 18 months ago but no recent suggestion of infection and appearance not looking like infection currently.   5.  ALLERGIES LISTED TO LEVAQUIN AND PENICILLIN.  The Levaquin was a rash.  The penicillin caused some kind of hyperactive reactive thing so was not a true allergy.   6 MRSA colonized     RECOMMENDATIONS:   1.  Continue  cefepime and vancomycin , cxs all neg.  Zithromax on the assumption this is a conventional respiratory infection, in fact the imaging has the appearance of atypical infection(  completed tx ). Probably discontinue vanco soon  2 Vent and sedated 50% O2,worsened incl CXR last PM  3 Ileal conduit dysfct, urology eval               Interval History:   Vent sedated. No fever recurrence, O2 worse  no new focal sxs, WBC 10 K flu neg ileal conduit not draining 1000cc residual              Medications:       Buprenorphine HCl  450 mcg Buccal BID     ceFEPIme (MAXIPIME) IV  1 g Intravenous Q12H     chlorhexidine  15 mL Mouth/Throat Q12H     sennosides  5-10 mL Oral or Feeding Tube BID    And     docusate   mg Oral BID     enoxaparin  60 mg Subcutaneous Q12H     famotidine  20 mg Intravenous Q12H     hydrocortisone sodium succinate PF  50 mg Intravenous Q6H     lidocaine         multivitamins w/minerals  15 mL  SATHISH PROGRESS NOTE    SUBJECTIVE:   Received page from RN that contractions have spaced out to every 7-9 minutes. Monika is resting comfortably with her epidural. Mitchel is supportive at the bedside. Monika states she has been able to get some sleep since epidural placement and she is not feeling any contractions.     OBJECTIVE:  /64   Temp 98.1  F (36.7  C) (Oral)   Resp 18   LMP 12/10/2021   SpO2 94%     Fetal heart tones: Baseline 120s   Variability: moderate  Accelerations: present  Decelerations: absent    Contractions: Pt is carmelo every 7-9 minutes, lasting 90 seconds and palpates moderate    Cervix: 6cm / 90% / 0, Vtx  ROM: AROM for bloody, thin meconium stained fluid  Reviewed risks/benefits of AROM prior to proceeding and obtained verbal consent.     Pitocin- none  Antibiotics- none  Cervical ripening: N/A    ASSESSMENT:  IUP @ 41w0d active labor   GBS- negative  Rh neg  Reassuring fetal status  Thin meconium stained fluid    PLAN:     Discussed that contractions have spaced out since last check at 0630. Recommended AROM or pitocin for augmentation. Reviewed risks/benefits of both. Pt opts for AROM. Bloody/thin meconium stained amniotic fluid noted. Cervix unchanged from last check at 0630.   Anticipate   reevaluate in 2 hours/PRN    Philly Howard CNM     "Per Feeding Tube Daily     sodium chloride (PF)  10 mL Intracatheter Q8H     sodium chloride (PF)  3 mL Intracatheter Q8H     traZODone  100 mg Oral At Bedtime     vancomycin place paige - receiving intermittent dosing  1 each Intravenous See Admin Instructions     vitamin A-D & C drops  7 mL Per OG Tube Daily     zinc sulfate  220 mg Per OG Tube Q48H                  Physical Exam:   Blood pressure 118/74, pulse 82, temperature 96.1  F (35.6  C), resp. rate (!) 37, height 1.753 m (5' 9\"), weight 90.9 kg (200 lb 6.4 oz), SpO2 95 %, not currently breastfeeding.  Wt Readings from Last 2 Encounters:   12/17/18 90.9 kg (200 lb 6.4 oz)   08/30/17 56.7 kg (125 lb)     Vital Signs with Ranges  Temp:  [95.9  F (35.5  C)-97.4  F (36.3  C)] 96.1  F (35.6  C)  Heart Rate:  [] 82  Resp:  [15-41] 37  BP: ()/() 118/74  FiO2 (%):  [40 %] 40 %  SpO2:  [89 %-100 %] 95 %    Constitutional: Vent and sedated   Lungs: Congestion to auscultation bilaterally, few crackles no wheezing   Cardiovascular: Regular rate and rhythm, normal S1 and S2, and no murmur noted   Abdomen: Normal bowel sounds, soft, non-distended, non-tender   Skin: No rashes, no cyanosis, no edema wd not seen   Other:           Data:   All microbiology laboratory data reviewed.  Recent Labs   Lab Test 12/18/18  0310 12/17/18  0420 12/16/18  1050   WBC 10.8 6.6 5.6   HGB 7.6* 7.4* 7.0*   HCT 23.7* 23.1* 22.1*   MCV 80 80 80    165 139*     Recent Labs   Lab Test 12/18/18  0310 12/17/18  0420 12/16/18  0525   CR 0.35* 0.35* 0.32*     Recent Labs   Lab Test 02/20/17  1745   SED 61*     Recent Labs   Lab Test 12/16/18  0920 12/12/18  2120 12/12/18  2115 02/18/17  1210 09/04/14  1105 04/14/14  0955 01/15/14  1005 12/28/13  0922 09/13/13  1502   CULT No growth No growth No growth Moderate growth Peptostreptococcus anaerobius Susceptibility testing not   routinely done  *  Heavy growth Enterococcus faecalis  Light growth Klebsiella pneumoniae  Light " growth Coagulase negative Staphylococcus Susceptibility testing not   routinely done  Light growth Candida glabrata Susceptibility testing not routinely done  * >100,000 colonies/mL Escherichia coli  10,000 to 50,000 colonies/mL Mixed gram positive tulio  * Culture negative after 4 weeks  No anaerobes isolated  On day 2, isolated in broth only: Staphylococcus aureus* Culture negative after 4 weeks  On day 2, isolated in broth only: Gram positive cocci in clusters Refer to  Routine Aerobic Culture for Identification. No anaerobes isolated*  On day 2, isolated in broth only: Staphylococcus aureus* >100,000 colonies/mL Mixed gram negative and positive tulio >100,000 colonies/mL Pseudomonas aeruginosa

## 2022-09-23 NOTE — H&P
SATH[facility] Labor Admission History & Physical    Monika Yan is a 27 year old  with an IUP at 41w0d  ; ,   Partner/support Person: tiffani PENN and patient mother   Language Barrier: English  Clinic: [facility]   Provider: Callie    Monika Yan is admitted to the Birthplace at [facility] on 2022 at 12:21 AM       History of present inllness/Chief Complaint:    Here with: spontaneous onset of labor  Patient reports contractions are Regular           Baby active Yes  Membranes are intact.  Bloody show Yes   Any changes with medical history since last prenatal visit No  Declines syphilis screening.  Has had negative x2 in pregnancy    Obstetrical history  Estimated Date of Delivery: Sep 16, 2022 determined by LMP, consistent with early dating   Patient's last menstrual period was 12/10/2021.   Dating U/S: 2022- at 7w6d    Fetal anatomic survey: Normal- level II with MFM for family hx of CHD  Placenta: posterior    PRENATAL COURSE  Prenatal care began at 8 wks gestation for a total of 13 prenatal visits.  Total wt gain 26; There is no height or weight on file to calculate BMI.  Prenatal Blood Pressure: WNL  Prenatal course was essentially uncomplicated  Tdap: 2022  Rhogam: 2022    Patient Active Problem List   Diagnosis     Rh negative state in antepartum period     Encounter for supervision of normal first pregnancy in first trimester     Indication for care in labor and delivery, antepartum       HISTORY  No Known Allergies  Past Medical History:   Diagnosis Date     No pertinent past medical history 2022     Past Surgical History:   Procedure Laterality Date     NO HISTORY OF SURGERY  2022     Family History   Problem Relation Age of Onset     Heart Defect Brother      Social History     Tobacco Use     Smoking status: Never Smoker     Smokeless tobacco: Never Used   Substance Use Topics     Alcohol use: Not Currently     OB History    Para  Term  AB Living   1 0 0 0 0 0   SAB IAB Ectopic Multiple Live Births   0 0 0 0 0      # Outcome Date GA Lbr Edgar/2nd Weight Sex Delivery Anes PTL Lv   1 Current                LABS:  Lab Results   Component Value Date    AS Negative 2022    HGB 12.4 2022    HEPBANG Nonreactive 2022       GBS Status: negative   Rubella: Immune    HIV: Non-Reactive   Platelets:  171  1hr GCT:  135  3hr: fastin, 1hr: 171, 2 hr: 163, 3 hr:132. Passed     ROS   Pt is alert and oriented  Pt denies significant constitutional symptoms including fever and/or malaise.    Pt denies significant respiratory, cardiovacular, GI, or muscular/skeletal complaints.    Neuro: Denies HA and visual changes  Muscoloskeletal: Denies except for discomforts r/t pregnancy     PHYSICAL EXAM:  /80   Resp 18   LMP 12/10/2021   General appearance:  healthy, alert and active   Heart: RRR  Lungs: CTA bilaterally, normal respiratory effort  Abdomen: gravid, single vertex fetus, non-tender, EFW 3400g  Legs: reflexes 2+ bilaterally, no clonus, no edema     Contractions: Pt is carmelo every 2-5 minutes, lasting  seconds and palpates moderate    Fetal heart tones: Baseline 125   Variability: moderate   Accelerations: present  Decelerations: absent    NST: reactive    Cervix: 4.5/ 80-90% / Midline/ soft/ -1, Vtx  Bloody show: yes   Membranes:  Intact, bag palpated     ASSESSMENT:  27 year old  with garcia IUP 41w0d in early labor  NST reactive  GBS negative and membranes intact  Rh negative     PLAN:  Routine CNM care  Ok for IA per unit protocol   Pain plan per patient preference  Encourage upright positioning to aid in labor progress and fetal descent, reviewed birth ball and ambulation   Labs ordered: CBC and type and screen. Covid test result negative.     Teaching done r/t comfort measures, pain management options, and labor processes    Admit - see IP orders    Anticipate     Reevaluate in 2-4 hours, sooner  as indicated.     BEN ValdesM

## 2022-09-23 NOTE — PLAN OF CARE
Data: Patient presented to Birthplace: 2022 11:30 PM.  Reason for maternal/fetal assessment is uterine contractions. Patient reports contractions every 3.5-5.5 minutes that increased in intensity.  Patient is a .  Prenatal record reviewed. Pregnancy has been uncomplicated..  Gestational Age 40w6d. VSS. Fetal movement active. Patient denies leaking of vaginal fluid/rupture of membranes, vaginal bleeding, abdominal pain, pelvic pressure, nausea, vomiting, headache, visual disturbances, epigastric or URQ pain, significant edema. Support person is present.   Action: Verbal consent for EFM. Triage assessment completed. Bill of rights reviewed.  Response: Patient verbalized agreement with plan. Will contact SATHISH Pickett with update and for further orders.

## 2022-09-23 NOTE — PROVIDER NOTIFICATION
09/23/22 1340   Provider Notification   Provider Name/Title Philly BOWER   Method of Notification At Bedside     CNM at bedside to see patient. SVE 10/100/+2. Patient would like to labor down, CNM Ok with this. Patient placed in high throne position.

## 2022-09-23 NOTE — PROVIDER NOTIFICATION
09/23/22 0831   Provider Notification   Provider Name/Title SATHISH Fraga   Method of Notification Electronic Page   Request Evaluate - Remote   Notification Reason Uterine Activity     CNM web-paged re: contractions spaced to 7-9 min apart. Patient open to AROM.

## 2022-09-23 NOTE — TELEPHONE ENCOUNTER
"S: Possible Labor  B:  40W6D  A: Pt calling contractions closer and more consist and more painful  30-40 each duration between contractions  3-4 minutes between contractions  Painful difficult to breath through  Getting light headed  Called Midwife @ 1046 Elvira Pickett (on-call)  Midwife Elvira Hoover (On call) called back 1049 PM  1055 called pt and gave her instruction to head to [facility] now per Midwife recommendation.  Disposition:   Call L&D @ [facility] and gave report.  [facility] expecting pt.    Radha Soares RN on 2022 at 11:04 PM    Reason for Disposition    [1] First baby (primipara) AND [2] contractions < 6 minutes apart  AND [3] present 2 hours    Additional Information    Negative: Passed out (i.e., lost consciousness, collapsed and was not responding)    Negative: Shock suspected (e.g., cold/pale/clammy skin, too weak to stand, low BP, rapid pulse)    Negative: Difficult to awaken or acting confused (e.g., disoriented, slurred speech)    Negative: [1] SEVERE abdominal pain (e.g., excruciating) AND [2] constant AND [3] present > 1 hour    Negative: Severe bleeding (e.g., continuous red blood from vagina, or large blood clots)    Negative: Umbilical cord hanging out of the vagina (shiny, white, curled appearance, \"like telephone cord\")    Negative: Uncontrollable urge to push (i.e., feels like baby is coming out now)    Negative: Can see baby    Negative: Sounds like a life-threatening emergency to the triager    Negative: Pregnant < 37 weeks (i.e., )    Negative: [1] Uncertain delivery date AND [2] possibly pregnant < 37 weeks (i.e., )    Answer Assessment - Initial Assessment Questions  1. ONSET: \"When did the symptoms begin?\"         Contraction last night 8 PM -   2. CONTRACTIONS: \"Describe the contractions that you are having.\" (e.g., duration, frequency, regularity, severity)     30-40   3. MAUREEN: \"What date are you expecting to deliver?\"      *No Answer*  4. PARITY: " "\"Have you had a baby before?\" If Yes, ask: \"How long did the labor last?\"      *No Answer*  5. FETAL MOVEMENT: \"Has the baby's movement decreased or changed significantly from normal?\"      *No Answer*  6. OTHER SYMPTOMS: \"Do you have any other symptoms?\" (e.g., leaking fluid from vagina, vaginal bleeding, fever, hand/facial swelling)      *No Answer*    Protocols used: PREGNANCY - LABOR-A-AH    OB Triage Call      Is patient's OB/Midwife with the [facility] or [facility]? [facility]      Is patient's delivering hospital on divert? No      40w6d    Estimated Date of Delivery: Sep 16, 2022        OB History    Para Term  AB Living   1 0 0 0 0 0   SAB IAB Ectopic Multiple Live Births   0 0 0 0 0      # Outcome Date GA Lbr Edgar/2nd Weight Sex Delivery Anes PTL Lv   1 Current                No results found for: GBS       Radha Soares RN 22 10:36 PM  [facility] Nurse Advisor    "

## 2022-09-23 NOTE — PROVIDER NOTIFICATION
09/23/22 0040   Provider Notification   Provider Name/Title SATHISH Felix   Method of Notification At Bedside     CNM at bedside to discuss POC with pt and family. Pt questions and concerns addressed.

## 2022-09-23 NOTE — PROVIDER NOTIFICATION
09/23/22 0536   Provider Notification   Provider Name/Title Elvira CNM   Method of Notification At Bedside     CNM at bedside to get update on pt status. Epidural placement now completed. Pt becoming more comfortable, feeling less cramping and pressure with contractions. FHR tracing category I with moderate variability with accels, no decels present.     CNM will come to bedside once pt is more comfortable to check cervix.

## 2022-09-23 NOTE — ANESTHESIA PREPROCEDURE EVALUATION
Anesthesia Pre-Procedure Evaluation    Patient: Monika Yan   MRN: [id number] : 1995        Procedure :           Past Medical History:   Diagnosis Date     No pertinent past medical history 2022      Past Surgical History:   Procedure Laterality Date     NO HISTORY OF SURGERY  2022      No Known Allergies   Social History     Tobacco Use     Smoking status: Never Smoker     Smokeless tobacco: Never Used   Substance Use Topics     Alcohol use: Not Currently      Wt Readings from Last 1 Encounters:   22 87.5 kg (193 lb)        Anesthesia Evaluation            ROS/MED HX  ENT/Pulmonary:  - neg pulmonary ROS     Neurologic:  - neg neurologic ROS     Cardiovascular:  - neg cardiovascular ROS     METS/Exercise Tolerance:     Hematologic:  - neg hematologic  ROS     Musculoskeletal:       GI/Hepatic:       Renal/Genitourinary:       Endo:  - neg endo ROS     Psychiatric/Substance Use:       Infectious Disease:       Malignancy:       Other:    at 41w 0d            OUTSIDE LABS:  CBC:   Lab Results   Component Value Date    WBC 14.6 (H) 2022    WBC 10.5 2022    HGB 12.8 2022    HGB 12.4 2022    HCT 38.3 2022    HCT 36.1 2022     2022     2022     BMP: No results found for: NA, POTASSIUM, CHLORIDE, CO2, BUN, CR, GLC  COAGS: No results found for: PTT, INR, FIBR  POC: No results found for: BGM, HCG, HCGS  HEPATIC: No results found for: ALBUMIN, PROTTOTAL, ALT, AST, GGT, ALKPHOS, BILITOTAL, BILIDIRECT, IRON  OTHER: No results found for: PH, LACT, A1C, ESSENCE, PHOS, MAG, LIPASE, AMYLASE, TSH, T4, T3, CRP, SED    Anesthesia Plan    ASA Status:  2      Anesthesia Type: Epidural.              Consents    Anesthesia Plan(s) and associated risks, benefits, and realistic alternatives discussed. Questions answered and patient/representative(s) expressed understanding.    - Discussed:     - Discussed with:  Patient         Postoperative  Care            Comments:    Other Comments: Continuous Labor Epidural: Indication is for labor pain. Following an discussion of the procedure, epidural expectations, and risks and benefits (risks including, but not limited to, nerve damage, infection, bleeding/hematoma, inadvertent dural puncture, spinal headache, partial or failed block), the patient appears to understand and consents to proceed. Questions were encouraged and answered.             Radha Pan MD

## 2022-09-23 NOTE — PROVIDER NOTIFICATION
09/23/22 1329   Provider Notification   Provider Name/Title SATHISH Fraga   Method of Notification Electronic Page   Notification Reason Status Update     CNM sent web-page with a status update. Ctx 2-3 min apart, more bloody show noted. Strip had intermittent variable and early decels which have resolved with position change.

## 2022-09-23 NOTE — PROGRESS NOTES
CNM PROGRESS NOTE    SUBJECTIVE:  Monika is now comfortable with epidural. Mitchel at bedside and supportive.     OBJECTIVE:  /65   Temp 98.1  F (36.7  C) (Oral)   Resp 18   LMP 12/10/2021   SpO2 97%     Fetal heart tones: Baseline 120   Variability: moderate  Accelerations: present  Decelerations: absent    Contractions: Pt is carmelo every 2-4 minutes, lasting 60-90 seconds    Cervix: 6/ 90% / 0, Vtx,  ROM: not ruptured, BOW palpated     Pitocin- none  Antibiotics- none  Cervical ripening: N/A    ASSESSMENT:  IUP @ 41w0d active labor   GBS- negative  Rh negative   Epidural anesthesia      PLAN:   - Continue plan of care  - Assist with position changes and peanut ball use to aid in labor progress   - Discussed AROM if augmentation needed- at this time Monika would like to sleep, open to AROM if augmentation needed     reevaluate in 2-4 hours/PRN, Next on-call midwife, Philly Howard, to assume cares at 0800     BEN Valdes CNM

## 2022-09-23 NOTE — PROVIDER NOTIFICATION
09/23/22 0000   Provider Notification   Provider Name/Title SATHISH Felix   Method of Notification In Department   Request Evaluate in Person   Discuss with CNM that per toco contractions every 2-4 minutes that pt is actively breathing through. FHT Category I with moderate variability and accelerations, no decels noted.     CNM states she will check SVE and evaluate plan of care.

## 2022-09-23 NOTE — PROVIDER NOTIFICATION
09/22/22 6631   Provider Notification   Provider Name/Title SATHISH Felix   Method of Notification Phone   Request Evaluate in Person   Notification Reason Patient Arrived   Discuss with CNM triage information (see previous note). Pt breathing through contractions.    CNM states she will come bedside to evaluate.

## 2022-09-23 NOTE — ANESTHESIA PROCEDURE NOTES
Epidural catheter Procedure Note    Pre-Procedure   Staff -        Anesthesiologist:  Radha Pan MD       Performed By: anesthesiologist       Referred By: SATHISH Pickett       Location: OB       Pre-Anesthestic Checklist: patient identified, IV checked, risks and benefits discussed, informed consent, monitors and equipment checked, pre-op evaluation and at physician/surgeon's request  Timeout:       Correct Patient: Yes        Correct Procedure: Yes        Correct Site: Yes        Correct Position: Yes   Procedure Documentation  Procedure: epidural catheter       Diagnosis: Labor pain       Patient Position: sitting       Skin prep: Chloraprep       Local skin infiltrated with 1 mL of 1% lidocaine.        Insertion Site: L4-5. (midline approach).       Technique: LORT saline        CIERRA at 5 cm.       Needle Type: Touhy needle       Needle Gauge: 17.        Needle Length (Inches): 3.5        Catheter: 19 G.          Catheter threaded easily.         5 cm epidural space.         Threaded 10 cm at skin.         # of attempts: 1 and  # of redirects:  0    Assessment/Narrative         Paresthesias: No.       Insertion/Infusion Method: LORT saline       Aspiration negative for Heme or CSF via Epidural Catheter.    Medication(s) Administered   0.25% Bupivacaine PF (Epidural) - EPIDURAL   8 mL - 9/23/2022 5:30:00 AM   Comments:  Procedure tolerated well without apparent complications. Initial MDA bolus of 0.25% bupivacaine was incrementally given without difficulty or complications. Epidural infusion (0.125% bupi with fentanyl 2mcg/ml) started at 10 ml/hr with PCEA of 5 ml q15min with a max cumulative dose of 25 ml/hr. PCEA instructions given and use encouraged PRN. Epidural expectations again reviewed and questions answered. Patient hemodynamically stable.  Patient and epidural functionality to be reassessed later via vital sign flowsheet, nursing communication, and/or patient report.  Anesthesiologist immediately  available for ongoing assessment and management.

## 2022-09-23 NOTE — PROVIDER NOTIFICATION
09/23/22 0630   Provider Notification   Provider Name/Title SATHISH Felix   Method of Notification At Bedside   Notification Reason SVE;Labor Status     CNM at bedside to check cervix after epidural and del cid catheter placement. SVE: 6/90/0, membrane still intact. Pt comfortable with epidural, BP WNL.

## 2022-09-23 NOTE — PROGRESS NOTES
CNM PROGRESS NOTE    SUBJECTIVE:  Monika is up in rocking chair, supported by pillows. Mom and Mitchel are in room sleeping at bedside. Monika states she is unsure if contractions feel any different. She is closing her eyes in between, she is getting very tired. No leaking of fluid. Wondering about labor plan and pain options.    OBJECTIVE:  /76 (BP Location: Left arm, Patient Position: Sitting, Cuff Size: Adult Regular)   Temp 98.2  F (36.8  C) (Oral)   Resp 18   LMP 12/10/2021     Fetal heart tones: IA not yet charted but no concerns per RN  Contractions: Pt is carmelo every 2-5 minutes per patient report     Cervix: deferred  ROM: not ruptured    Pitocin- none  Antibiotics- none  Cervical ripening: N/A    ASSESSMENT:  IUP @ 41w0d latent labor    GBS- negative  Rh negative  Normal FHT     PLAN:   - reviewed different pain options and risks and benefits, is nervous about getting epidural too soon or too late if she does get one. States most likely reason she would get it is for ability to sleep. Nervous about trying fentanyl   - Discussed the latent labor time period being variable, reviewed that slow progress would be normal if her cervix has not changed a whole lot but there is no immediate for SVE. She is curious about her SVE but does not want one right now.   - Made plan to return in 2 hours for SVE and can review options at that time to help her epidural planning. Can have pain medications at any time. Answered questions regarding AROM.     Anticipate   reevaluate in 2 hrs     BEN Valdes CNM

## 2022-09-23 NOTE — L&D DELIVERY NOTE
OB Vaginal Delivery Note    Monika Yan MRN# [id number]   Age: 27 year old YOB: 1995       GA: 41w0d  GP:   Labor Complications: None   EBL:   mL  Delivery QBL:    Delivery Type: Vaginal, Spontaneous   ROM to Delivery Time: (Delivered) Hours: 7 Minutes: 16   Weight:     1 Minute 5 Minute 10 Minute   Apgar Totals: 9   9        MEL GRIFFITH;KIANA DAVENPORT     Delivery Details:  Monika Yan, a 27 year old  female delivered a viable infant with apgars of 9  and 9 . Patient was fully dilated and pushing after   hours   minutes in active labor. Delivery was via vaginal, spontaneous  to a sterile field under epidural  anesthesia. Infant delivered in vertex  left  occiput  anterior  position. Loose nuchal cord reduce over fetal head. Anterior and posterior shoulders delivered without difficulty. The cord was clamped after it ceased pulsing, cut by FILI Grullon and 3 vessels  were noted. Cord blood was obtained in routine fashion with the following disposition: lab .     NICU present at delivery d/t meconium stained amniotic fluid and tachycardia prior to delivery. Infant was vigorous after birth and NICU excused themselves.     One elevated axillary temp of 102.8F, with normal oral recheck 20 mins later of 99.1F. Did not meet criteria for chorioamnionitis diagnosis.     Cord complications: nuchal   Placenta delivered at 2022  4:22 PM . Placental disposition was Hospital disposal . Fundal massage performed and fundus found to be firm, however brisk vaginal bleeding noted. Methergine IM and cytotec CT given. Fundal massage with clots expressed, after which bleeding subsided. QBL pending.     Episiotomy: none    Perineum, vagina, cervix were inspected, and the following lacerations were noted:   Perineal lacerations: 2nd     labial laceration: bilateral           Any lacerations were repaired in the usual fashion using 3-0, 4-0.    Excellent hemostasis was noted. Needle  count correct. Infant and patient in delivery room in good and stable condition.        Yuriy Female-Monika [[id number]]    Labor Event Times    Labor onset date: 22 Onset time:  1:00 AM   Dilation complete date: 22 Complete time:  1:49 PM   Start pushing date/time: 2022 1504      Labor Events     labor?: No   steroids: None  Labor Type: Spontaneous, AROM, Augmentation  Predominate monitoring during 1st stage: continuous electronic fetal monitoring     Rupture date/time: 22 0848   Rupture type: Artificial Rupture of Membranes  Fluid color: Bloody, Meconium  Fluid odor: Normal     Augmentation: Oxytocin, AROM  Augmentation date/time: 22 1130   Indications for augmentation: Ineffective Contraction Pattern  1:1 continuous labor support provided by?: RN       Delivery/Placenta Date and Time    Delivery Date: 22 Delivery Time:  4:04 PM   Placenta Date/Time: 2022  4:22 PM  Oxytocin given at the time of delivery: after delivery of placenta  Delivering clinician: Philly Howard CNM   Other personnel present at delivery:  Provider Role   Sofie Castellanos RN Registered Nurse   Julianna Rodriguez RN Delivery Assist         Vaginal Counts     Initial count performed by 2 team members:  Two Team Members   SAMEER Mckeon CNM       Needles Suture Needles Sponges (RETIRED) Instruments   Initial counts 2  5    Added to count  4 10    Relief counts       Final counts             Placed during labor Accounted for at the end of labor   FSE No NA   IUPC No NA   Cervidil No NA                     Apgars    Living status: Living   1 Minute 5 Minute 10 Minute 15 Minute 20 Minute   Skin color: 1  1       Heart rate: 2  2       Reflex irritability: 2  2       Muscle tone: 2  2       Respiratory effort: 2  2       Total: 9  9       Apgars assigned by: SOFIE MANTILLA RN     Cord    Vessels: 3 Vessels    Cord Complications: Nuchal   Nuchal Intervention: reduced         Nuchal cord  description: loose nuchal cord         Cord Blood Disposition: Lab    Gases Sent?: No    Delayed cord clamping?: Yes    Cord Clamping Delay (seconds): >120 seconds    Stem cell collection?: No       [place] Resuscitation    Methods: None  Output in Delivery Room: Stool      Measurements    Output in delivery room: Stool     Skin to Skin and Feeding Plan    Skin to skin initiation date/time: 1841    Skin to skin with: Mother  Skin to skin end date/time:        Labor Events and Shoulder Dystocia    Fetal Tracing Prior to Delivery: Category 2  Fetal Tracing Comments: tachycardia with accelerations, early decels, and moderate variability   Shoulder dystocia present?: Neg     Delivery (Maternal) (Provider to Complete) ([id number])    Episiotomy: None  Perineal lacerations: 2nd Repaired?: Yes   Labial laceration: bilateral Repaired?: Yes   Repair suture: 3-0 Vicryl, 4-0 Vicryl  Genital tract inspection done: Pos     Blood Loss  Mother: YuriyMonika R #[id number]   Start of Mother's Information    Delivery Blood Loss  22 0100 - 22 1740    None           End of Mother's Information  Mother: Monika Yan R #[id number]          Delivery - Provider to Complete ([id number])    Delivering clinician: Philly Howard CNM CNM Care: Exclusive SATHISH care in labor  Attempted Delivery Types (Choose all that apply): Spontaneous Vaginal Delivery  Delivery Type (Choose the 1 that will go to the Birth History): Vaginal, Spontaneous                   Other personnel:  Provider Role   Sofie Castellanos RN Registered Nurse   Julianna Rodriguez RN Delivery Assist                Placenta    Date/Time: 2022  4:22 PM  Removal: Spontaneous  Disposition: Hospital disposal           Anesthesia    Method: Epidural  Cervical dilation at placement: 4-7                Presentation and Position    Presentation: Vertex    Position: Left Occiput Anterior                 Philly Howard CNM

## 2022-09-23 NOTE — PROVIDER NOTIFICATION
09/23/22 1107   Provider Notification   Provider Name/Title Kyle BOWER   Method of Notification At Bedside     Philly CNM at bedside to see patient. SVE 6-7/90/0 with bloody show. Philly aware of tocometer adjusted multiple times, monitoring has been difficult. Verbal order received for pitocin augmentation per protocol. See order

## 2022-09-24 LAB — HGB BLD-MCNC: 10.5 G/DL (ref 11.7–15.7)

## 2022-09-24 PROCEDURE — 85018 HEMOGLOBIN: CPT | Performed by: ADVANCED PRACTICE MIDWIFE

## 2022-09-24 PROCEDURE — 120N000001 HC R&B MED SURG/OB

## 2022-09-24 PROCEDURE — 250N000013 HC RX MED GY IP 250 OP 250 PS 637: Performed by: ADVANCED PRACTICE MIDWIFE

## 2022-09-24 PROCEDURE — 999N000082 HC STATISTIC IP LACTATION SERVICES 46-60 MIN

## 2022-09-24 PROCEDURE — 36415 COLL VENOUS BLD VENIPUNCTURE: CPT | Performed by: ADVANCED PRACTICE MIDWIFE

## 2022-09-24 PROCEDURE — 250N000011 HC RX IP 250 OP 636: Performed by: ADVANCED PRACTICE MIDWIFE

## 2022-09-24 RX ADMIN — IBUPROFEN 800 MG: 800 TABLET, FILM COATED ORAL at 10:16

## 2022-09-24 RX ADMIN — IBUPROFEN 800 MG: 800 TABLET, FILM COATED ORAL at 16:24

## 2022-09-24 RX ADMIN — ACETAMINOPHEN 650 MG: 325 TABLET, FILM COATED ORAL at 02:47

## 2022-09-24 RX ADMIN — IBUPROFEN 800 MG: 800 TABLET, FILM COATED ORAL at 22:43

## 2022-09-24 RX ADMIN — HUMAN RHO(D) IMMUNE GLOBULIN 300 MCG: 1500 SOLUTION INTRAMUSCULAR; INTRAVENOUS at 06:33

## 2022-09-24 RX ADMIN — IBUPROFEN 800 MG: 800 TABLET, FILM COATED ORAL at 02:47

## 2022-09-24 RX ADMIN — DOCUSATE SODIUM 100 MG: 100 CAPSULE, LIQUID FILLED ORAL at 10:16

## 2022-09-24 RX ADMIN — ACETAMINOPHEN 650 MG: 325 TABLET, FILM COATED ORAL at 06:33

## 2022-09-24 ASSESSMENT — ACTIVITIES OF DAILY LIVING (ADL)
ADLS_ACUITY_SCORE: 18

## 2022-09-24 NOTE — LACTATION NOTE
Lactation in with feeds multiple times today. Using a shield for flat nipple on right side and slightly inverted on left. Bruise on right nipple from a previous feed noted, mother has already started mother love cream and gel pads so soreness. Baby nursing well, needing some stimulation in the beginning to get in to a suck pattern. Encouraged independence with getting baby latch, but welcome to call if struggling. Baby did have a spit up episode before getting to the breast for a feed, reviewed how to clear using bulb syringe. Reviewed breastfeeding education, cleaning and care of nipple shield. Lots of questions answered. Will continue to support.

## 2022-09-24 NOTE — PLAN OF CARE
Data: Monika Yan transferred to [facility] via wheelchair at 1920. Baby transferred via parent's arms.  Action: Receiving unit notified of transfer: Yes. Patient and family notified of room change. Report given to Randi at 1932. Belongings sent to receiving unit. Accompanied by Registered Nurse. Oriented patient to surroundings. Call light within reach. ID bands double-checked with receiving RN.  Response: Patient tolerated transfer and is stable.

## 2022-09-24 NOTE — PROGRESS NOTES
Chalo Dos Santos CNM Progress Note: Postpartum Day #1    2022  10:03 AM    SUBJECTIVE:  Patient is stable and is tolerating acitivity well  Baby is rooming in  Complications since 2 hours post delivery: None  Pain is well controlled.  Patient is taking pain medications.  Breastfeeding status:initiated.  Has many questions. Answered and encouraged her to have the RN observe her next feeding for feedback.   Elimination:  She is voiding with difficulty.  She has not had a bowel movement  Denies heavy bleeding and passing large clots.  Feels good about birth experience.    OBJECTIVE:  /70 (BP Location: Left arm, Patient Position: Semi-Alonso's, Cuff Size: Adult Regular)   Pulse 86   Temp 98.2  F (36.8  C) (Oral)   Resp 18   LMP 12/10/2021   SpO2 94%   Breastfeeding Unknown     Constitutional: healthy, alert and no distress    Breasts: Currently breastfeeding    Fundus: Uterine fundus is firm, non-tender and at 2 below the level of the umbilicus    Perineum: Perineum is well approximated, minimal swelling    Lochia: Lochia is appropriate for the duration of time since delivery.     ASSESSMENT:  PPD #1    Hemoglobin at 10.5 this AM.   ml. Asymptomatic.   Doing well.  Normal healing wound.  No excessive bleeding  Pain well-controlled.  Hemoglobin   Date Value Ref Range Status   2022 10.5 (L) 11.7 - 15.7 g/dL Final   ]      PLAN:  Continue routine care  Ambulation encouraged  Advance diet as tolerated  Breast feeding strategies discussed  Reviewed breastfeeding.  Please order breast pump on discharge.  Anticipated discharge 22      BEN Murray, SATHISH

## 2022-09-24 NOTE — PLAN OF CARE
Data: VSS. Postpartum checks within normal limits - see flow record. Patient eating and drinking normally. Patient able to empty bladder independently and is up ambulating. No apparent signs of infection. Patient performing self cares, is able to care for infant and is breastfeeding every 2-3 hours. Using nipple shield for smooth nipples. Patient states both nipples are tender, redness and bruising to right nipple, using nipple cream and hydrogel pads.  2nd degree and labial laceration  WNL, using ice and tucks pads.    Action: Patient medicated with ibuprofen during the shift for pain. See MAR.  Patient education done, see flow record.  Response: Patient is bonding well with baby. FOB at bedside, supportive.  Plan: Continue current plan of care.  Anticipate discharge tomorrow.

## 2022-09-24 NOTE — PLAN OF CARE
vitally stable, fundal checks WDL, laceration (using tucks/ice), pain medications given with pain reassessment 1 hour after, PIV removed after rhogam given, voiding, no nausea, ambulating independently, appropriate bonding/cares for baby, breast feeding, using shield on L side that is inverted, given nipple cream and hydrogels, education completed with all cares

## 2022-09-24 NOTE — PLAN OF CARE
Patient is vitally stable. Fundus and lochia WDL. Ambulating independently. Voiding without difficulty. Denies pain. Breastfeeding with nipple shield. Bonding well with baby. FOB at bedside and supportive.

## 2022-09-25 ENCOUNTER — TELEPHONE (OUTPATIENT)
Dept: OBGYN | Facility: CLINIC | Age: 27
End: 2022-09-25

## 2022-09-25 VITALS
SYSTOLIC BLOOD PRESSURE: 102 MMHG | HEART RATE: 86 BPM | RESPIRATION RATE: 16 BRPM | DIASTOLIC BLOOD PRESSURE: 55 MMHG | TEMPERATURE: 97.7 F | OXYGEN SATURATION: 94 %

## 2022-09-25 PROCEDURE — 250N000013 HC RX MED GY IP 250 OP 250 PS 637: Performed by: ADVANCED PRACTICE MIDWIFE

## 2022-09-25 RX ORDER — ACETAMINOPHEN 325 MG/1
650 TABLET ORAL EVERY 4 HOURS PRN
Qty: 30 TABLET | Refills: 0 | COMMUNITY
Start: 2022-09-25 | End: 2022-10-05

## 2022-09-25 RX ORDER — DOCUSATE SODIUM 100 MG/1
100 CAPSULE, LIQUID FILLED ORAL DAILY
COMMUNITY
Start: 2022-09-25 | End: 2022-11-07

## 2022-09-25 RX ORDER — IBUPROFEN 800 MG/1
800 TABLET, FILM COATED ORAL EVERY 6 HOURS PRN
COMMUNITY
Start: 2022-09-25 | End: 2022-11-07

## 2022-09-25 RX ADMIN — IBUPROFEN 800 MG: 800 TABLET, FILM COATED ORAL at 10:39

## 2022-09-25 RX ADMIN — ACETAMINOPHEN 650 MG: 325 TABLET, FILM COATED ORAL at 13:33

## 2022-09-25 RX ADMIN — IBUPROFEN 800 MG: 800 TABLET, FILM COATED ORAL at 04:06

## 2022-09-25 RX ADMIN — DOCUSATE SODIUM 100 MG: 100 CAPSULE, LIQUID FILLED ORAL at 10:39

## 2022-09-25 ASSESSMENT — ACTIVITIES OF DAILY LIVING (ADL)
ADLS_ACUITY_SCORE: 18

## 2022-09-25 NOTE — DISCHARGE INSTRUCTIONS
Postpartum Vaginal Delivery Instructions  Follow up with midwife or Obgyn in 2 weeks and 6 weeks for post-delivery checks    Activity     Ask family and friends for help when you need it.  Do not place anything in your vagina for 6 weeks.  You are not restricted on other activities, but take it easy for a few weeks to allow your body to recover from delivery.  You are able to do any activities you feel up to that point.  No driving until you have stopped taking your pain medications (usually two weeks after delivery).     Call your health care provider if you have any of these symptoms:     Increased pain, swelling, redness, or fluid around your stiches from an episiotomy or perineal tear.  A fever above 100.4 F (38 C) with or without chills when placing a thermometer under your tongue.  You soak a sanitary pad with blood within 1 hour, or you see blood clots larger than a golf ball.  Bleeding that lasts more than 6 weeks.  Vaginal discharge that smells bad.  Severe pain, cramping or tenderness in your lower belly area.  A need to urinate more frequently (use the toilet more often), more urgently (use the toilet very quickly), or it burns when you urinate.  Nausea and vomiting.  Redness, swelling or pain around a vein in your leg.  Problems breastfeeding or a red or painful area on your breast.  Chest pain and cough or are gasping for air.  Problems coping with sadness, anxiety, or depression.  If you have any concerns about hurting yourself or the baby, call your provider immediately.   You have questions or concerns after you return home.     Keep your hands clean:  Always wash your hands before touching your perineal area and stitches.  This helps reduce your risk of infection.  If your hands aren't dirty, you may use an alcohol hand-rub to clean your hands. Keep your nails clean and short.

## 2022-09-25 NOTE — PROGRESS NOTES
Entered pt room for discharge teaching and she had just woken up and was tearful, wondering if CNM could come back at a later time to discuss discharge as she is feeling overwhelmed. Mitchel supportive at bedside. They state baby was cluster feeding overnight so they got very little sleep. Monika is nursing with the nipple shield. Discussed breastfeeding strategies and mental health. Will return at a later point for full discharge teaching.

## 2022-09-25 NOTE — PLAN OF CARE
vitally stable, fundal checks WDL, laceration (using tucks/ice), pain medications given with pain reassessment 1 hour after, voiding, no nausea, ambulating independently, showered, appropriate bonding/cares for baby, was tearful due to difficulties with second nighting infant, breast feeding, using shield on both sides as L is inverted and R is sore, using nipple cream and hydrogels, education completed with all cares

## 2022-09-25 NOTE — CONSULTS
[facility]  MATERNAL CHILD HEALTH   INITIAL PSYCHOSOCIAL ASSESSMENT     DATA:     Reason for Social Work Consult: Anxiety/postpartum    Presenting Information: SW met with EMI Frank and FILI () in patient's room.    Living Situation: Monika MIDDLETON and Mitchel PENN live together in [place], [place]. They moved to [place] about 4 months ago so they would be closer to [employer] job in [place].    Social Support: Parents report that they have good family and friend support.    Education: Both parents graduated from college.    Employment: Independent [employer] .    Insurance: [employer]    Mental Health History: EMI reports no history of anxiety. She reported that she has been more and more concerned about the birth of this child. She had been very worried about the pain. Now that it's over she is feeling less and less pain.    History of Postpartum Mood Disorders: 1st baby, EMI reports no history of depression. Recent anxiety but she relates this to being so scared about the birthing process and pain.     Chemical Health History: No concerns at this time.    Community Resources/PHN/WIC : Reviewed some [facility] resources and hand written information given.    Assessment of Support System is stable,involved, and appropriate, adequate    Family interactions (attentiveness to baby, interactions between parents) Good internation between MON and FOB both are supportive of another. They both are very attentive and loving with baby.     Identified Barriers no barriers to discharge.    Level of engagement with SW Good eye contact with SW and she engaged well with SW.      INTERVENTION:       SW completed chart review and collaborated with the multidisciplinary team. yes    Psychosocial Assessment yes    Introduction to Maternal Child Health  role and scope of practice yes    Reviewed Hospital and Community Resources yes    Assessed Chemical Health History and Current Symptoms yes    Assessed Mental  Health History and Current Symptoms yes    Identified stressors, barriers and family concerns yes    Provided support and active empathetic listening and validation. yes    Provided psychoeducation on  mood and anxiety disorders, assessed for any current symptoms or history: yes    Provided brochure Depression and Anxiety During and after Pregnancy yes     ASSESSMENT:     Coping: adequate and functional    Affect: appropriate & bright    Mood: appropriate, calm and bright    Motivation/Ability to Access Services: Highly motivated, and is independent in accessing services. Some of the resources I had given her she had already inquired about them in community.  Assessment of Support System: stable, involved, appropriate and adequate    Level of engagement with SW: Engaged and appropriate. Able to seek out SW when needs arise.     Family and parent/infant interactions: attentiveness to baby and positive interactions. Parents seem supportive of each other and are bonding well with baby.    Assessment of parental risk for PMAD: 1st baby, MOB reports no history of depression. Recent anxiety but she relates this to being so scared about the birthing process and pain.     Vulnerabilities: anxiety at times.      Identified Barriers: None at this time     PLAN:     SW will continue to follow throughout pt's Maternal-Child Health Journey as needs arise. SW will continue to collaborate with the multidisciplinary team.    Lizbet Garcia [facility] CenterEDWARD LEON   Inpatient Care Coordination   Supervisor  [facility]  [phone]

## 2022-09-25 NOTE — TELEPHONE ENCOUNTER
Please call Monika to help her schedule an in person 2 and 6 week postpartum visit.     Thanks!  BEN Rodriguez, SATHISH

## 2022-09-25 NOTE — DISCHARGE SUMMARY
[facility]    Discharge Summary  Obstetrics    Date of Admission:  2022  Date of Discharge:  2022  Discharging Provider: Philly Howard CNM  Date of Service (when I saw the patient): 22    Discharge Diagnoses   PPD2 s/p     History of Present Illness   Monika Yan is a 27 year old female who presented with spontaneous labor at 41w0d ega. Labor stalled at 6cm so was augmented with AROM and pitocin. Monika progressed to complete and delivered a viable female infant under epidural anesthesia. Infant weighed 7lb 5oz with apgars 9/9. Brisk bleeding immediately postpartum managed with methergine and cytotec. QBL 552mL. Monika had a second degree perineal laceration and bilateral labial lacerations repaired in usual fashion.     Hospital Course   The patient's hospital course was unremarkable.  She recovered as anticipated and experienced no post-delivery complications.  On discharge, her pain was well controlled. Vaginal bleeding is similar to peak menstrual flow.  Voiding without difficulty.  Ambulating well and tolerating a normal diet.  No fevers.  Breastfeeding well with nipple shield.  Infant is stable.  She was discharged on post-partum day #2.    Post-partum hemoglobin:   Hemoglobin   Date Value Ref Range Status   2022 10.5 (L) 11.7 - 15.7 g/dL Final       Philly Howard CNM    Discharge Disposition   Discharged to home   Condition at discharge: Stable    Postpartum warning signs for hypertension, DVT, PE, hemorrhage, infection, mastitis, and PPD v baby blues reviewed.   Discussed calling clinic number  with concerns  Counseled to follow up with Callie in 2 and 6wks      Primary Care Physician   Physician No Ref-Primary    Consultations This Hospital Stay   CARE MANAGEMENT / SOCIAL WORK IP CONSULT  ANESTHESIOLOGY IP CONSULT  CARE MANAGEMENT / SOCIAL WORK IP CONSULT  LACTATION IP CONSULT    Discharge Orders      Postpartum Home Care Referral      Activity     Activity as tolerated     Reason for your hospital stay    Maternity care     Follow Up    Follow up with provider in 2 weeks and 6 weeks for post-delivery checks     Breast pump - Manual/Electric    Breast Pump Documentation:  Manual/Electric Pump: To support adequate breast milk production and nutrition for infant.     I, the undersigned, certify that the above prescribed supplies are medically necessary for this patient and is both reasonable and necessary in reference to accepted standards of medical and necessary in reference to accepted standards of medical practice in the treatment of this patient's condition and is not prescribed as a convenience.     Diet    Resume previous diet     Discharge Medications   Current Discharge Medication List      START taking these medications    Details   acetaminophen (TYLENOL) 325 MG tablet Take 2 tablets (650 mg) by mouth every 4 hours as needed for mild pain or fever (greater than or equal to 38  C /100.4  F (oral) or 38.5  C/ 101.4  F (core).)  Qty: 30 tablet, Refills: 0    Associated Diagnoses: Indication for care in labor and delivery, antepartum      docusate sodium (COLACE) 100 MG capsule Take 1 capsule (100 mg) by mouth daily    Associated Diagnoses: Indication for care in labor and delivery, antepartum      ibuprofen (ADVIL/MOTRIN) 800 MG tablet Take 1 tablet (800 mg) by mouth every 6 hours as needed for other (cramping)    Associated Diagnoses: Indication for care in labor and delivery, antepartum         CONTINUE these medications which have NOT CHANGED    Details   fish oil-omega-3 fatty acids 1000 MG capsule Take 2 g by mouth daily      Prenatal Vit-Fe Fumarate-FA (PNV PRENATAL PLUS MULTIVITAMIN) 27-1 MG TABS per tablet Take 1 tablet by mouth daily         STOP taking these medications       calcium-magnesium (CALMAG) 500-250 MG TABS per tablet Comments:   Reason for Stopping:         ferrous sulfate (FEROSUL) 325 (65 Fe) MG tablet Comments:   Reason for  Stopping:             Allergies   No Known Allergies

## 2022-09-25 NOTE — PLAN OF CARE
Data: VSS. Postpartum checks within normal limits - see flow record. Patient eating and drinking normally. Patient able to empty bladder independently and is up ambulating. No apparent signs of infection. Patient performing self cares, is able to care for infant and is breastfeeding every 2-3 hours. Mother is using a nipple shield. 2nd degree laceration and labial laceration WNL, using ice and tucks pads.  Action: Patient medicated tylenol and ibuprofen during the shift for pain. See MAR.  Patient education done, see flow record.  Response: Parents are bonding well with baby. FOB at bedside, supportive.    Discharge instructions completed.  Patient states she understands all discharge instructions and all her questions have been answered.  Verbalizes when she needs to return to clinic for follow up for herself and baby.  She is caring for herself and her baby independently.  Prescriptions reviewed and given.  Postpartum depression symptoms reviewed and encouraged frequent review of depression scale. Breast pump prescription printed and given. Social work consult completed. Patient is anxious about plan of care for baby. Social work gave resources. Patient discharged with  and infant at 1555.

## 2022-09-28 ENCOUNTER — TELEPHONE (OUTPATIENT)
Dept: MIDWIFE SERVICES | Facility: CLINIC | Age: 27
End: 2022-09-28

## 2022-09-28 DIAGNOSIS — M79.661 RIGHT CALF PAIN: Primary | ICD-10-CM

## 2022-09-28 NOTE — TELEPHONE ENCOUNTER
Agree with plan. Order cancelled per patient preference. To call back with concerns before 2 wk.    BEN ValdesM

## 2022-09-28 NOTE — TELEPHONE ENCOUNTER
"PT calling  on , states on the inside of her right calf she has a slight \"twinge\" or tender area when she pushes.  Is aware she is at higher risk of blood clot during pregnancy/postpartum.    No redness (maybe slight discoloration if anything) no warmth that she notes.  No pain when flexing foot towards her body.    Sending to [facility] pool to review and advise.    Kianna Hall RN        "

## 2022-09-28 NOTE — TELEPHONE ENCOUNTER
Low suspicion for DVT but US order placed. To call back with worsening of pain, redness, warmth, and/swelling while waiting to be scheduled for US.     BEN Valdes CNM

## 2022-09-28 NOTE — TELEPHONE ENCOUNTER
Pt advised of below.  She does not want to schedule US at this time.  I will send her a message with the number to call if she experiences any redness, warmth or worsening pain as noted.    Pt verbalized understanding, she does note that she has a 2 wk pp check coming up where she will discuss further.    Kianna Hall RN

## 2022-10-05 ENCOUNTER — TELEPHONE (OUTPATIENT)
Dept: MIDWIFE SERVICES | Facility: CLINIC | Age: 27
End: 2022-10-05

## 2022-10-05 ENCOUNTER — PRENATAL OFFICE VISIT (OUTPATIENT)
Dept: MIDWIFE SERVICES | Facility: CLINIC | Age: 27
End: 2022-10-05
Payer: COMMERCIAL

## 2022-10-05 VITALS
BODY MASS INDEX: 27.04 KG/M2 | DIASTOLIC BLOOD PRESSURE: 70 MMHG | SYSTOLIC BLOOD PRESSURE: 108 MMHG | WEIGHT: 178.4 LBS | HEIGHT: 68 IN

## 2022-10-05 PROCEDURE — 99207 PR POST PARTUM EXAM: CPT | Performed by: ADVANCED PRACTICE MIDWIFE

## 2022-10-05 NOTE — PROGRESS NOTES
Midwife Postpartum 2 Week Visit    Monika Yan is a 27 year old here for a 2 week postpartum checkup.     Delivery date was 2022. She had a  of a viable girl, weight 7 pounds 5.1 oz., with no complications      Since delivery, she has been breast feeding. She continues to use the nipple shield with success. She does not have concerns with latching or supply. She has not had any signs of infection. Her lochia is now scant, states she stopped bleeding  yesterday.  She has not had other complications.      She is voiding and having bowel movements without difficulty.       Contraception was discussed and patient desires condoms.   She  has not had intercourse since delivery.   She complains of No  perineal discomfort.     Mood is Stable   EPDS 6  Patient screened for postpartum depression.   Depression Rating was:   Last PHQ-9 score on record = No flowsheet data found.  Last GAD7 score on record = No flowsheet data found.  Alcohol Score = 0    ROS:  CONSTITUTIONAL: NEGATIVE for fever, chills, change in weight  INTEGUMENTARU/SKIN: NEGATIVE for worrisome rashes, moles or lesions  EYES: NEGATIVE for vision changes or irritation  ENT: NEGATIVE for ear, mouth and throat problems  RESP: NEGATIVE for significant cough or SOB  BREAST: NEGATIVE for masses, tenderness or discharge  CV: NEGATIVE for chest pain, palpitations or peripheral edema  GI: NEGATIVE for nausea, abdominal pain, heartburn, or change in bowel habits  : NEGATIVE for unusual urinary or vaginal symptoms. Periods are absent  MUSCULOSKELETAL: NEGATIVE for significant arthralgias or myalgia  NEURO: NEGATIVE for weakness, dizziness or paresthesias  PSYCHIATRIC: NEGATIVE for changes in mood or affect      Current Outpatient Medications:      docusate sodium (COLACE) 100 MG capsule, Take 1 capsule (100 mg) by mouth daily, Disp: , Rfl:      ibuprofen (ADVIL/MOTRIN) 800 MG tablet, Take 1 tablet (800 mg) by mouth every 6 hours as needed for other  "(cramping) (Patient taking differently: Take 400 mg by mouth every 6 hours as needed for other (cramping)), Disp: , Rfl:      Prenatal Vit-Fe Fumarate-FA (PNV PRENATAL PLUS MULTIVITAMIN) 27-1 MG TABS per tablet, Take 1 tablet by mouth daily, Disp: , Rfl: .   OB History    Para Term  AB Living   1 1 1 0 0 1   SAB IAB Ectopic Multiple Live Births   0 0 0 0 1      # Outcome Date GA Lbr Edgar/2nd Weight Sex Delivery Anes PTL Lv   1 Term 22 41w0d 12:49 / 02:15 3.32 kg (7 lb 5.1 oz) F Vag-Spont EPI N WEI      Name: HAYDE,FEMALE-MG      Apgar1: 9  Apgar5: 9     Last pap:  No results found for: PAP  Hgb in hospital was 10.5    EXAM:  /70 (BP Location: Right arm, Cuff Size: Adult Regular)   Ht 1.727 m (5' 8\")   Wt 80.9 kg (178 lb 6.4 oz)   LMP 12/10/2021   Breastfeeding Yes   BMI 27.13 kg/m    BMI: Body mass index is 27.13 kg/m .  Exam:  Constitutional: healthy, alert and no distress  Respiratory: negative, Percussion normal. Good diaphragmatic excursion.   Psychiatric: mentation appears normal and affect normal/bright  Discussed option for pelvic exam. She declines.     ASSESSMENT:   Normal postpartum exam after .    ICD-10-CM    1. Routine postpartum follow-up  Z39.2          PLAN:  No results found for any visits on 10/05/22.    Return for 6 week postpartum visit.  Teaching: birth control and mental health  Family Planning:condoms  Encourage Kegels and abdominal exercise.  Continue a multivitamin/prenatal supplement, especially if breastfeeding.  Postpartum Hgb was not done today.      Philly Howard, APRN, CNM      "

## 2022-10-05 NOTE — NURSING NOTE
"Chief Complaint   Patient presents with     Postpartum Care      on 2022, reports that she is breastfeeding and that it is going well. No concerns reported.       Initial /70 (BP Location: Right arm, Cuff Size: Adult Regular)   Ht 1.727 m (5' 8\")   Wt 80.9 kg (178 lb 6.4 oz)   LMP 12/10/2021   Breastfeeding Yes   BMI 27.13 kg/m   Estimated body mass index is 27.13 kg/m  as calculated from the following:    Height as of this encounter: 1.727 m (5' 8\").    Weight as of this encounter: 80.9 kg (178 lb 6.4 oz).  BP completed using cuff size: regular    Questioned patient about current smoking habits.  Pt. has never smoked.        "

## 2022-11-07 ENCOUNTER — PRENATAL OFFICE VISIT (OUTPATIENT)
Dept: MIDWIFE SERVICES | Facility: CLINIC | Age: 27
End: 2022-11-07
Payer: COMMERCIAL

## 2022-11-07 VITALS
BODY MASS INDEX: 26.45 KG/M2 | SYSTOLIC BLOOD PRESSURE: 100 MMHG | DIASTOLIC BLOOD PRESSURE: 70 MMHG | WEIGHT: 174.5 LBS | HEIGHT: 68 IN

## 2022-11-07 DIAGNOSIS — R68.82 LOW LIBIDO: ICD-10-CM

## 2022-11-07 DIAGNOSIS — Z12.4 SCREENING FOR CERVICAL CANCER: ICD-10-CM

## 2022-11-07 PROCEDURE — 99207 PR POST PARTUM EXAM: CPT | Performed by: ADVANCED PRACTICE MIDWIFE

## 2022-11-07 PROCEDURE — G0145 SCR C/V CYTO,THINLAYER,RESCR: HCPCS | Performed by: ADVANCED PRACTICE MIDWIFE

## 2022-11-07 NOTE — PROGRESS NOTES
Midwife Postpartum 6 Week Visit    Monika Yan is a 27 year old here for a postpartum checkup.     Delivery date was 2022. She had a  of a viable girl, weight 7 pounds 5.1 oz., with no complications      Since delivery, she has been breast feeding and pumping.  States she wonders if she has an oversupply - pumps 16oz in AM. Reports she mostly pumps and bottle feeds during the day, but will occasionally try to put baby to breast. She continues to use the nipple shield d/t concerns with nipple inversion.   She has not had any signs of infection, her lochia stopped after 4 weeks.  She has concerns with low libido. States she noticed low libido in her pregnancy, and it has worsened postpartum. She has not yet had intercourse since delivery.     She is voiding and having bowel movements without difficulty.      Contraception was discussed and patient desires condoms.   She  has not had intercourse since delivery.   She complains of No  perineal discomfort.     Mood is Stable  Patient screened for postpartum depression.   Depression Rating was:   Last PHQ-9 score on record = No flowsheet data found.  Last GAD7 score on record = No flowsheet data found.  Alcohol Score = 0    ROS:  CONSTITUTIONAL: NEGATIVE for fever, chills, change in weight  INTEGUMENTARU/SKIN: NEGATIVE for worrisome rashes, moles or lesions  EYES: NEGATIVE for vision changes or irritation  ENT: NEGATIVE for ear, mouth and throat problems  RESP: NEGATIVE for significant cough or SOB  BREAST: NEGATIVE for masses, tenderness or discharge  CV: NEGATIVE for chest pain, palpitations or peripheral edema  GI: NEGATIVE for nausea, abdominal pain, heartburn, or change in bowel habits  : NEGATIVE for unusual urinary or vaginal symptoms. Periods are absent since delivery.   MUSCULOSKELETAL: NEGATIVE for significant arthralgias or myalgia  NEURO: NEGATIVE for weakness, dizziness or paresthesias  PSYCHIATRIC: NEGATIVE for changes in mood or  "affect    No current outpatient medications on file..   OB History    Para Term  AB Living   1 1 1 0 0 1   SAB IAB Ectopic Multiple Live Births   0 0 0 0 1      # Outcome Date GA Lbr Edgar/2nd Weight Sex Delivery Anes PTL Lv   1 Term 22 41w0d 12:49 / 02:15 3.32 kg (7 lb 5.1 oz) F Vag-Spont EPI N WEI      Name: HAYDEFEMALE-MG      Apgar1: 9  Apgar5: 9     Last pap:  No results found for: PAP  Hgb in hospital was 10.5    EXAM:  /70 (BP Location: Left arm, Cuff Size: Adult Regular)   Ht 1.727 m (5' 8\")   Wt 79.2 kg (174 lb 8 oz)   LMP 12/10/2021   BMI 26.53 kg/m    BMI: Body mass index is 26.53 kg/m .  Exam:  Constitutional: healthy, alert and no distress  Respiratory: negative  Breasts: Deferred as patient is lactating  Musculoskeletal: extremities normal- no gross deformities noted, gait normal and normal muscle tone  Skin: no suspicious lesions or rashes  Neurologic: Gait normal. Reflexes normal and symmetric. Sensation grossly WNL.  Psychiatric: mentation appears normal and affect normal/bright    PELVIC EXAM:  Vulva: No lesions, well healed, BUS WNL, no tenderness  Vagina: Moist, pink, discharge normal  well rugated, no lesions  Cervix:smooth, pink, no visible lesions, pap collected   Uterus: Involuted to normal size, non-tender, no masses palpated  Ovaries: No masses palpated  Rectal exam: deferred      ASSESSMENT:   Normal postpartum exam after .    ICD-10-CM    1. Routine postpartum follow-up  Z39.2 Pap thin layer screen reflex to HPV if ASCUS - recommend age 25 - 29      2. Screening for cervical cancer  Z12.4 Pap thin layer screen reflex to HPV if ASCUS - recommend age 25 - 29      3. Low libido  R68.82             PLAN:    Discussed low libido is multifactorial and should be addressed from a physical and social/emotional perspective. Discussed common changes to libido during pregnancy/postpartum. Encouraged lubricant use as vaginal dryness can be common while " breastfeeding. Discussed that pain with intercoure needs to be evaluated. She desires further evaluation  - Information given for [facility]  Reviewed option to see IBCLC to discuss getting baby back to breast, she declines. Resources given.       Return as needed or at time of next expected pap, pelvic, or breast exam.  Teaching: birth control and mental health  Family Planning:condoms  Encourage Kegels and abdominal exercise.  Continue a multivitamin/prenatal supplement, especially if breastfeeding.  Pap smear was obtained today.  Postpartum Hgb was not done today.    BEN Rodriguez, CNM

## 2022-11-07 NOTE — PATIENT INSTRUCTIONS
"[facility] for Sexual and Gender Health:  https://med.[facility]/sexualhealth    Sexual Health through [facility]:  https://www.[facility].com/care/specialty/sexual-medicine/    Book: \"Come as you are\"  https://www.[employer].com/book/show/85791439-vubi-yp-mqw-ivx    Donate breast milk:  [place] [employer] [facility]  https://www.mnmilkbank.org/    Lactation Resources     Before baby is born:   [facility] parenting classes (https://[employer]/class/breastfeeding/)  2.5 hr breastfeeding preparation class with in-person and virtual options  Cost: 45-50 dollars  Enlightened Mama prenatal consult (https://www.[employer]/)  60 minute in office or 30 minute phone consult   30-75 dollars, offered on a sliding scale payment system (adjusted to what you can afford to pay, no one is turned away)  MilkyMama breastfeeding class (https://mil[employer]a.[employer]/courses/breastfeeding-101/lectures/8606211)  ~60 minutes of video content and written guides   27 dollars  Lactation Link breastfeeding basics class (https://lactationlink.com/breastfeeding-basics/)  70 minute video class   97 dollars   [facility] breastfeeding class (https://www.medela.us/breastfeeding/services/breastfeeding-university)  80-90 minutes of video content  25 dollars     After baby is born:  Enlightened Mama lactation visits (https://www.[employer]/)   min sessions with virtual, home, and in office options    dollars, offered on a sliding scale payment system (adjusted to what you can afford to pay, no one is turned away)  La Leche League resources and support groups (http://www.[employer].org/)  Virtual support meetings for local breastfeeding people   Free  Contact your pediatrician's clinic  Many pediatrician's clinics have lactation consultants on staff that you can schedule with   [facility]   Dr. Abimbola Flores, IBCLC- Pediatrician and Lactation Consultant  Sees patients in " [place] ([phone]) and [place] ()  Hilary Lyle DNP, IBCLC-  Family Nurse Practitioner and Lactation Consultant   Sees patients in [place] ([phone]) and [place] ([phone])  Silvia Arita DNP, CPNP-AC/PC, IBCLC- Pediatric Nurse Practitioner and Lactation Consultant  Sees patients in [place] ([phone])  Earline Faulkner CPNP, IBCLC- Pediatric Nurse Practitioner and Lactation Consultant   Sees patients in [place] ([phone]) and [place] ([phone])  Cristy Wayne APRN, CNM, IBCLC- Certified Nurse Midwife and Lactation Consultant   Sees patients in [place] ([phone])  Lluvia Montanez APRN, CNM, IBCLC- Certified Nurse Midwife and Lactation Consultant  Sees patients in [place] ([phone]) and [place] ([phone])    Cost/co-pays for these visits vary based on insurance coverage     General resources   National Women's Health Information Center's breastfeeding resources (https://www.womenshealth.gov/breastfeeding)  Information on breastfeeding basics and troubleshooting   Free  Women, Infants and Children breastfeeding support (https://wicbreastfeeding.fns.usda.gov/)  Information on breastfeeding basics and troubleshooting   Free  American College of Nurse Midwives Share With Women (https://onlinelibrary.stanford.com/page/journal/23901460/homepage/breastfeeding)  Information on breastfeeding basics and birth control while breastfeeding   Free        Outside of [employer] employed lactation consultants, [facility] has no monetary ties to suggested classes, support groups, or resources

## 2022-11-07 NOTE — NURSING NOTE
"Chief Complaint   Patient presents with     Prenatal Care      2022. Reports that she is concerned about low libido and would like to discuss medications/supplements to help with this. States that she has a low appetite, concerned this is from breastfeeding/pumping.        Initial /70 (BP Location: Left arm, Cuff Size: Adult Regular)   Ht 1.727 m (5' 8\")   Wt 79.2 kg (174 lb 8 oz)   LMP 12/10/2021   BMI 26.53 kg/m   Estimated body mass index is 26.53 kg/m  as calculated from the following:    Height as of this encounter: 1.727 m (5' 8\").    Weight as of this encounter: 79.2 kg (174 lb 8 oz).  BP completed using cuff size: regular    Questioned patient about current smoking habits.  Pt. has never smoked.          The following HM Due: pap smear  "

## 2022-11-09 LAB
BKR LAB AP GYN ADEQUACY: NORMAL
BKR LAB AP GYN INTERPRETATION: NORMAL
BKR LAB AP HPV REFLEX: NORMAL
BKR LAB AP PREVIOUS ABNORMAL: NORMAL
PATH REPORT.COMMENTS IMP SPEC: NORMAL
PATH REPORT.COMMENTS IMP SPEC: NORMAL
PATH REPORT.RELEVANT HX SPEC: NORMAL

## 2023-05-20 ENCOUNTER — HEALTH MAINTENANCE LETTER (OUTPATIENT)
Age: 28
End: 2023-05-20

## 2024-06-08 NOTE — ANESTHESIA POSTPROCEDURE EVALUATION
Patient: Monika Yan    Procedure: * No procedures listed *       Anesthesia Type:  Epidural    Note:  Disposition: Inpatient   Postop Pain Control:    PONV:    Neuro/Psych:    Airway/Respiratory:    CV/Hemodynamics:    Other NRE:    DID A NON-ROUTINE EVENT OCCUR?     Event details/Postop Comments:  Labor epidural analgesia satisfactory.  L&D RN removed epidural catheter.  No residual sensorimotor blockade.  Patient denies headache, fever or chills.  She will notify postpartum RN of any problems or questions.           Last vitals:  Vitals:    09/23/22 2202 09/24/22 0200 09/24/22 1016   BP: 94/46 111/70 100/68   Pulse: 108 86 84   Resp: 14 18 18   Temp: 98.9  F (37.2  C) 98.2  F (36.8  C) 98  F (36.7  C)   SpO2:          Electronically Signed By: Moncho Whiteside MD  September 24, 2022  1:53 PM   to predict whether or not you may have another 1.  If so there is no way of predicting whether you could be revived from a second episode.

## 2024-07-27 ENCOUNTER — HEALTH MAINTENANCE LETTER (OUTPATIENT)
Age: 29
End: 2024-07-27

## 2025-08-10 ENCOUNTER — HEALTH MAINTENANCE LETTER (OUTPATIENT)
Age: 30
End: 2025-08-10